# Patient Record
Sex: MALE | Race: WHITE | NOT HISPANIC OR LATINO | Employment: OTHER | URBAN - METROPOLITAN AREA
[De-identification: names, ages, dates, MRNs, and addresses within clinical notes are randomized per-mention and may not be internally consistent; named-entity substitution may affect disease eponyms.]

---

## 2017-01-04 DIAGNOSIS — M05.79 RHEUMATOID ARTHRITIS INVOLVING MULTIPLE SITES WITH POSITIVE RHEUMATOID FACTOR: ICD-10-CM

## 2017-01-04 RX ORDER — SULFASALAZINE 500 MG/1
1000 TABLET, DELAYED RELEASE ORAL 2 TIMES DAILY
Qty: 360 TABLET | Refills: 0 | Status: SHIPPED | OUTPATIENT
Start: 2017-01-04 | End: 2017-04-02 | Stop reason: SDUPTHER

## 2017-02-06 DIAGNOSIS — M35.00 SJOGREN'S SYNDROME: Chronic | ICD-10-CM

## 2017-02-06 RX ORDER — CEVIMELINE HYDROCHLORIDE 30 MG/1
30 CAPSULE ORAL 3 TIMES DAILY
Qty: 90 CAPSULE | Refills: 3 | Status: SHIPPED | OUTPATIENT
Start: 2017-02-06 | End: 2017-05-23 | Stop reason: SDUPTHER

## 2017-02-21 DIAGNOSIS — J20.9 ACUTE BRONCHITIS, UNSPECIFIED ORGANISM: Primary | ICD-10-CM

## 2017-02-21 DIAGNOSIS — M05.79 RHEUMATOID ARTHRITIS INVOLVING MULTIPLE SITES WITH POSITIVE RHEUMATOID FACTOR: ICD-10-CM

## 2017-02-21 RX ORDER — METHOTREXATE 2.5 MG/1
15 TABLET ORAL
Qty: 78 TABLET | Refills: 0 | Status: SHIPPED | OUTPATIENT
Start: 2017-02-21 | End: 2017-07-06 | Stop reason: SDUPTHER

## 2017-02-21 RX ORDER — TADALAFIL 5 MG/1
TABLET, FILM COATED ORAL
Refills: 4 | COMMUNITY
Start: 2016-12-21

## 2017-02-21 RX ORDER — AZITHROMYCIN 250 MG/1
TABLET, FILM COATED ORAL
Qty: 6 TABLET | Refills: 0 | Status: SHIPPED | OUTPATIENT
Start: 2017-02-21 | End: 2019-10-07 | Stop reason: SDUPTHER

## 2017-02-27 DIAGNOSIS — M05.79 RHEUMATOID ARTHRITIS INVOLVING MULTIPLE SITES WITH POSITIVE RHEUMATOID FACTOR: ICD-10-CM

## 2017-02-27 RX ORDER — MELOXICAM 7.5 MG/1
7.5 TABLET ORAL DAILY
Qty: 90 TABLET | Refills: 3 | Status: SHIPPED | OUTPATIENT
Start: 2017-02-27

## 2017-03-09 ENCOUNTER — PATIENT MESSAGE (OUTPATIENT)
Dept: RHEUMATOLOGY | Facility: CLINIC | Age: 72
End: 2017-03-09

## 2017-03-09 DIAGNOSIS — M05.79 RHEUMATOID ARTHRITIS INVOLVING MULTIPLE SITES WITH POSITIVE RHEUMATOID FACTOR: ICD-10-CM

## 2017-03-09 DIAGNOSIS — Z79.631 METHOTREXATE, LONG TERM, CURRENT USE: ICD-10-CM

## 2017-03-09 RX ORDER — LEUCOVORIN CALCIUM 5 MG/1
5 TABLET ORAL DAILY
Qty: 90 TABLET | Refills: 3 | Status: SHIPPED | OUTPATIENT
Start: 2017-03-09 | End: 2021-11-22

## 2017-03-16 ENCOUNTER — LAB VISIT (OUTPATIENT)
Dept: LAB | Facility: OTHER | Age: 72
End: 2017-03-16
Attending: INTERNAL MEDICINE
Payer: MEDICARE

## 2017-03-16 DIAGNOSIS — M35.00 SJOGREN'S SYNDROME: Chronic | ICD-10-CM

## 2017-03-16 DIAGNOSIS — Z79.631 METHOTREXATE, LONG TERM, CURRENT USE: ICD-10-CM

## 2017-03-16 DIAGNOSIS — M05.79 RHEUMATOID ARTHRITIS INVOLVING MULTIPLE SITES WITH POSITIVE RHEUMATOID FACTOR: Chronic | ICD-10-CM

## 2017-03-16 LAB
ALBUMIN SERPL BCP-MCNC: 4.2 G/DL
ALP SERPL-CCNC: 72 U/L
ALT SERPL W/O P-5'-P-CCNC: 22 U/L
ANION GAP SERPL CALC-SCNC: 7 MMOL/L
AST SERPL-CCNC: 30 U/L
BASOPHILS # BLD AUTO: 0.01 K/UL
BASOPHILS NFR BLD: 0.1 %
BILIRUB SERPL-MCNC: 0.6 MG/DL
BUN SERPL-MCNC: 27 MG/DL
CALCIUM SERPL-MCNC: 9.1 MG/DL
CHLORIDE SERPL-SCNC: 105 MMOL/L
CO2 SERPL-SCNC: 26 MMOL/L
CREAT SERPL-MCNC: 1.2 MG/DL
CRP SERPL-MCNC: 1.3 MG/L
DIFFERENTIAL METHOD: ABNORMAL
EOSINOPHIL # BLD AUTO: 0.1 K/UL
EOSINOPHIL NFR BLD: 1.7 %
ERYTHROCYTE [DISTWIDTH] IN BLOOD BY AUTOMATED COUNT: 13.7 %
ERYTHROCYTE [SEDIMENTATION RATE] IN BLOOD BY WESTERGREN METHOD: 9 MM/HR
EST. GFR  (AFRICAN AMERICAN): >60 ML/MIN/1.73 M^2
EST. GFR  (NON AFRICAN AMERICAN): >60 ML/MIN/1.73 M^2
GLUCOSE SERPL-MCNC: 93 MG/DL
HCT VFR BLD AUTO: 40.8 %
HGB BLD-MCNC: 13.8 G/DL
LYMPHOCYTES # BLD AUTO: 1.2 K/UL
LYMPHOCYTES NFR BLD: 17.1 %
MCH RBC QN AUTO: 32.5 PG
MCHC RBC AUTO-ENTMCNC: 33.8 %
MCV RBC AUTO: 96 FL
MONOCYTES # BLD AUTO: 0.6 K/UL
MONOCYTES NFR BLD: 7.8 %
NEUTROPHILS # BLD AUTO: 5.1 K/UL
NEUTROPHILS NFR BLD: 73 %
PLATELET # BLD AUTO: 139 K/UL
PMV BLD AUTO: 10.5 FL
POTASSIUM SERPL-SCNC: 4.2 MMOL/L
PROT SERPL-MCNC: 7.3 G/DL
RBC # BLD AUTO: 4.25 M/UL
SODIUM SERPL-SCNC: 138 MMOL/L
WBC # BLD AUTO: 7.02 K/UL

## 2017-03-16 PROCEDURE — 85025 COMPLETE CBC W/AUTO DIFF WBC: CPT

## 2017-03-16 PROCEDURE — 80053 COMPREHEN METABOLIC PANEL: CPT

## 2017-03-16 PROCEDURE — 86140 C-REACTIVE PROTEIN: CPT

## 2017-03-16 PROCEDURE — 36415 COLL VENOUS BLD VENIPUNCTURE: CPT

## 2017-03-16 PROCEDURE — 85651 RBC SED RATE NONAUTOMATED: CPT

## 2017-03-22 ENCOUNTER — OFFICE VISIT (OUTPATIENT)
Dept: RHEUMATOLOGY | Facility: CLINIC | Age: 72
End: 2017-03-22
Payer: MEDICARE

## 2017-03-22 VITALS
BODY MASS INDEX: 24.3 KG/M2 | DIASTOLIC BLOOD PRESSURE: 78 MMHG | SYSTOLIC BLOOD PRESSURE: 136 MMHG | WEIGHT: 179.38 LBS | HEART RATE: 77 BPM | HEIGHT: 72 IN

## 2017-03-22 DIAGNOSIS — M05.79 RHEUMATOID ARTHRITIS INVOLVING MULTIPLE SITES WITH POSITIVE RHEUMATOID FACTOR: Primary | Chronic | ICD-10-CM

## 2017-03-22 PROCEDURE — 99213 OFFICE O/P EST LOW 20 MIN: CPT | Mod: PBBFAC | Performed by: INTERNAL MEDICINE

## 2017-03-22 PROCEDURE — 99214 OFFICE O/P EST MOD 30 MIN: CPT | Mod: S$PBB,,, | Performed by: INTERNAL MEDICINE

## 2017-03-22 PROCEDURE — 99999 PR PBB SHADOW E&M-EST. PATIENT-LVL III: CPT | Mod: PBBFAC,,, | Performed by: INTERNAL MEDICINE

## 2017-03-22 RX ORDER — AZITHROMYCIN 250 MG/1
TABLET, FILM COATED ORAL
COMMUNITY
Start: 2017-02-27 | End: 2017-09-29 | Stop reason: ALTCHOICE

## 2017-03-22 RX ORDER — TAMSULOSIN HYDROCHLORIDE 0.4 MG/1
CAPSULE ORAL
COMMUNITY
Start: 2017-02-22 | End: 2017-09-29

## 2017-03-22 ASSESSMENT — ROUTINE ASSESSMENT OF PATIENT INDEX DATA (RAPID3)
PAIN SCORE: .5
WHEN YOU AWAKENED IN THE MORNING OVER THE LAST WEEK, PLEASE INDICATE THE AMOUNT OF TIME IT TAKES UNTIL YOU ARE AS LIMBER AS YOU WILL BE FOR THE DAY: 5-15 MIN
PATIENT GLOBAL ASSESSMENT SCORE: .5
PSYCHOLOGICAL DISTRESS SCORE: 1.1
FATIGUE SCORE: 1.5
AM STIFFNESS SCORE: 1, YES
MDHAQ FUNCTION SCORE: .1
TOTAL RAPID3 SCORE: .44

## 2017-03-22 NOTE — PROGRESS NOTES
"Subjective:       Patient ID: Tom Sutherland is a 71 y.o. male.    Chief Complaint: Follow-up    HPI     2011 symmetric polyarthritis and dry eyes, mouth  MTX 2011 - present  HCQ 2011 - present  Humira 2012 x 6 mo    [place]; +SSA, SSB, -CCP, RF 22  SSZ added to MTX + HCQ Aug 2015    Current:  MTX 12.5 mg / week   bid  SSZ 1000 bid  Meloxicam  cevimilene    Some hand stiffness but has been gardening, travelling, and making presentations    Caught a significant cold after a flight but it resolved    Pain 0.5/10  Global 0.5/10  MHAQ 0.1 / 3    Review of Systems   Constitutional: Negative for fatigue, fever and unexpected weight change.   HENT: Negative for mouth sores.    Respiratory: Negative for cough and shortness of breath.    Cardiovascular: Negative for chest pain.   Gastrointestinal: Negative for abdominal pain and diarrhea.   Genitourinary: Negative for dysuria.   Skin: Negative for rash.   Neurological: Negative for headaches.   Psychiatric/Behavioral: Negative for sleep disturbance.         Objective:     /78 (BP Location: Right arm, Patient Position: Sitting, BP Method: Automatic)  Pulse 77  Ht 5' 11.5" (1.816 m)  Wt 81.4 kg (179 lb 6.4 oz)  BMI 24.67 kg/m2     Physical Exam   Constitutional: He is well-developed, well-nourished, and in no distress.   HENT:   Mouth/Throat: No oropharyngeal exudate.   Eyes: No scleral icterus.   Abdominal: There is no tenderness.   Lymphadenopathy:     He has no cervical adenopathy.   Skin: No rash noted.     No rash but sl hyperpigmentation UE that may be due to HCQ   Musculoskeletal:   UE joints and Knees nonswollen nontender         CBC nl x Hgb 13.8  Esr 9  Creat 1.2  Alb 4.2  AST/ALT 30/22  CRP 1.3   Assessment:       1. Rheumatoid arthritis involving multiple sites with positive rheumatoid factor        Rheumatoid arthritis now controlled on triple therapy  Sjogren's syndrome with dry mouth symptoms improved on either side  Slight " thrombocytopenia on methotrexate in June  No interval infectious complications of current high risk medication therapy    Plan:     1.  Cont current meds  2.  Lab test this week and then in 3 months and in 6 months  3.  Return to see me in 6 months

## 2017-03-22 NOTE — MR AVS SNAPSHOT
[facility]  [address]  [facility] [address]  Phone: [phone]  Fax: [phone]                  Tom Sutherland   3/22/2017 4:00 PM   Office Visit    Description:  Male : 1945   Provider:  Shukri Lee MD   Department:  [facility]           Reason for Visit     Follow-up           Diagnoses this Visit        Comments    Rheumatoid arthritis involving multiple sites with positive rheumatoid factor    -  Primary            To Do List           Goals (5 Years of Data)     None      Follow-Up and Disposition     Return in about 6 months (around 2017).      [facility]     [facility] -  Assistance  Registered nurses in the [facility] provide clinical advisement, health education, appointment booking, and other advisory services.  Call for this free service at [phone].             Medications           Message regarding Medications     Verify the changes and/or additions to your medication regime listed below are the same as discussed with your clinician today.  If any of these changes or additions are incorrect, please notify your healthcare provider.             Verify that the below list of medications is an accurate representation of the medications you are currently taking.  If none reported, the list may be blank. If incorrect, please contact your healthcare provider. Carry this list with you in case of emergency.           Current Medications     aspirin (ECOTRIN) 81 MG EC tablet Take 81 mg by mouth once daily.    azithromycin (Z-JANEL) 250 MG tablet     cevimeline (EVOXAC) 30 mg capsule Take 1 capsule (30 mg total) by mouth 3 (three) times daily.    CIALIS 5 mg tablet TK 1 T PO QD    fluticasone (FLONASE) 50 mcg/actuation nasal spray     hydroxychloroquine (PLAQUENIL) 200 mg tablet Take 1 tablet (200 mg total) by mouth 2 (two) times daily.    leucovorin (WELLCOVORIN) 5 mg Tab Take 1 tablet (5 mg  "total) by mouth once daily.    losartan-hydrochlorothiazide 50-12.5 mg (HYZAAR) 50-12.5 mg per tablet     meloxicam (MOBIC) 7.5 MG tablet Take 1 tablet (7.5 mg total) by mouth once daily.    methotrexate 2.5 MG Tab Take 6 tablets (15 mg total) by mouth every 7 days.    PATANOL 0.1 % ophthalmic solution     ranitidine (ZANTAC) 150 MG tablet     sulfaSALAzine (AZULFIDINE) 500 MG TbEC Take 2 tablets (1,000 mg total) by mouth 2 (two) times daily.    tamsulosin (FLOMAX) 0.4 mg Cp24            Clinical Reference Information           Your Vitals Were     BP Pulse Height Weight BMI    136/78 (BP Location: Right arm, Patient Position: Sitting, BP Method: Automatic) 77 5' 11.5" (1.816 m) 81.4 kg (179 lb 6.4 oz) 24.67 kg/m2      Blood Pressure          Most Recent Value    BP  136/78      Allergies as of 3/22/2017     No Known Allergies      Immunizations Administered on Date of Encounter - 3/22/2017     None      Language Assistance Services     ATTENTION: Language assistance services are available, free of charge. Please call [phone].      ATENCIÓN: Si kendall caldwell, tiene a green disposición servicios gratuitos de asistencia lingüística. Llame al [phone].     ISMA Ý: N?u b?n nói Ti?ng Vi?t, có các d?ch v? h? tr? ngôn ng? mi?n phí dành cho b?n. G?i s? [phone].         [facility] complies with applicable Federal civil rights laws and does not discriminate on the basis of race, color, national origin, age, disability, or sex.        "

## 2017-04-02 DIAGNOSIS — M05.79 RHEUMATOID ARTHRITIS INVOLVING MULTIPLE SITES WITH POSITIVE RHEUMATOID FACTOR: ICD-10-CM

## 2017-04-03 RX ORDER — SULFASALAZINE 500 MG/1
TABLET, DELAYED RELEASE ORAL
Qty: 360 TABLET | Refills: 1 | Status: SHIPPED | OUTPATIENT
Start: 2017-04-03 | End: 2017-09-30 | Stop reason: SDUPTHER

## 2017-04-12 DIAGNOSIS — M05.79 RHEUMATOID ARTHRITIS INVOLVING MULTIPLE SITES WITH POSITIVE RHEUMATOID FACTOR: ICD-10-CM

## 2017-04-13 RX ORDER — HYDROXYCHLOROQUINE SULFATE 200 MG/1
200 TABLET, FILM COATED ORAL 2 TIMES DAILY
Qty: 60 TABLET | Refills: 3 | Status: SHIPPED | OUTPATIENT
Start: 2017-04-13 | End: 2017-08-08 | Stop reason: SDUPTHER

## 2017-05-09 ENCOUNTER — TELEPHONE (OUTPATIENT)
Dept: RHEUMATOLOGY | Facility: CLINIC | Age: 72
End: 2017-05-09

## 2017-05-23 ENCOUNTER — PATIENT MESSAGE (OUTPATIENT)
Dept: RHEUMATOLOGY | Facility: CLINIC | Age: 72
End: 2017-05-23

## 2017-05-23 DIAGNOSIS — M35.01 SJOGREN'S SYNDROME WITH KERATOCONJUNCTIVITIS SICCA: Primary | ICD-10-CM

## 2017-05-24 RX ORDER — CEVIMELINE HYDROCHLORIDE 30 MG/1
30 CAPSULE ORAL 3 TIMES DAILY
Qty: 180 CAPSULE | Refills: 3 | Status: SHIPPED | OUTPATIENT
Start: 2017-05-24 | End: 2018-05-31 | Stop reason: SDUPTHER

## 2017-07-05 ENCOUNTER — LAB VISIT (OUTPATIENT)
Dept: LAB | Facility: OTHER | Age: 72
End: 2017-07-05
Attending: NURSE PRACTITIONER
Payer: MEDICARE

## 2017-07-05 DIAGNOSIS — Z79.631 METHOTREXATE, LONG TERM, CURRENT USE: ICD-10-CM

## 2017-07-05 DIAGNOSIS — M05.79 RHEUMATOID ARTHRITIS INVOLVING MULTIPLE SITES WITH POSITIVE RHEUMATOID FACTOR: Chronic | ICD-10-CM

## 2017-07-05 DIAGNOSIS — M05.79 RHEUMATOID ARTHRITIS INVOLVING MULTIPLE SITES WITH POSITIVE RHEUMATOID FACTOR: ICD-10-CM

## 2017-07-05 DIAGNOSIS — M35.00 SJOGREN'S SYNDROME: Chronic | ICD-10-CM

## 2017-07-05 LAB
ALBUMIN SERPL BCP-MCNC: 4.1 G/DL
ALP SERPL-CCNC: 68 U/L
ALT SERPL W/O P-5'-P-CCNC: 19 U/L
ANION GAP SERPL CALC-SCNC: 8 MMOL/L
AST SERPL-CCNC: 21 U/L
BASOPHILS # BLD AUTO: 0.02 K/UL
BASOPHILS NFR BLD: 0.4 %
BILIRUB SERPL-MCNC: 0.5 MG/DL
BUN SERPL-MCNC: 25 MG/DL
CALCIUM SERPL-MCNC: 9.1 MG/DL
CHLORIDE SERPL-SCNC: 106 MMOL/L
CO2 SERPL-SCNC: 26 MMOL/L
CREAT SERPL-MCNC: 1 MG/DL
CRP SERPL-MCNC: 1 MG/L
DIFFERENTIAL METHOD: ABNORMAL
EOSINOPHIL # BLD AUTO: 0.2 K/UL
EOSINOPHIL NFR BLD: 3.6 %
ERYTHROCYTE [DISTWIDTH] IN BLOOD BY AUTOMATED COUNT: 13.7 %
ERYTHROCYTE [SEDIMENTATION RATE] IN BLOOD BY WESTERGREN METHOD: 7 MM/HR
EST. GFR  (AFRICAN AMERICAN): >60 ML/MIN/1.73 M^2
EST. GFR  (NON AFRICAN AMERICAN): >60 ML/MIN/1.73 M^2
GLUCOSE SERPL-MCNC: 77 MG/DL
HCT VFR BLD AUTO: 40.3 %
HGB BLD-MCNC: 13.9 G/DL
LYMPHOCYTES # BLD AUTO: 1.6 K/UL
LYMPHOCYTES NFR BLD: 31.2 %
MCH RBC QN AUTO: 33.4 PG
MCHC RBC AUTO-ENTMCNC: 34.5 %
MCV RBC AUTO: 97 FL
MONOCYTES # BLD AUTO: 0.5 K/UL
MONOCYTES NFR BLD: 10.5 %
NEUTROPHILS # BLD AUTO: 2.7 K/UL
NEUTROPHILS NFR BLD: 54.3 %
PLATELET # BLD AUTO: 142 K/UL
PMV BLD AUTO: 10.5 FL
POTASSIUM SERPL-SCNC: 4.1 MMOL/L
PROT SERPL-MCNC: 7.3 G/DL
RBC # BLD AUTO: 4.16 M/UL
SODIUM SERPL-SCNC: 140 MMOL/L
WBC # BLD AUTO: 5.03 K/UL

## 2017-07-05 PROCEDURE — 36415 COLL VENOUS BLD VENIPUNCTURE: CPT

## 2017-07-05 PROCEDURE — 86140 C-REACTIVE PROTEIN: CPT

## 2017-07-05 PROCEDURE — 85025 COMPLETE CBC W/AUTO DIFF WBC: CPT

## 2017-07-05 PROCEDURE — 85651 RBC SED RATE NONAUTOMATED: CPT

## 2017-07-05 PROCEDURE — 80053 COMPREHEN METABOLIC PANEL: CPT

## 2017-07-05 RX ORDER — METHOTREXATE 2.5 MG/1
15 TABLET ORAL
Qty: 78 TABLET | Refills: 0 | OUTPATIENT
Start: 2017-07-05 | End: 2017-09-28

## 2017-07-06 ENCOUNTER — TELEPHONE (OUTPATIENT)
Dept: RHEUMATOLOGY | Facility: CLINIC | Age: 72
End: 2017-07-06

## 2017-07-06 DIAGNOSIS — M05.79 RHEUMATOID ARTHRITIS INVOLVING MULTIPLE SITES WITH POSITIVE RHEUMATOID FACTOR: ICD-10-CM

## 2017-07-06 RX ORDER — METHOTREXATE 2.5 MG/1
15 TABLET ORAL
Qty: 78 TABLET | Refills: 0 | Status: SHIPPED | OUTPATIENT
Start: 2017-07-06 | End: 2017-09-29 | Stop reason: SDUPTHER

## 2017-07-06 RX ORDER — METHOTREXATE 2.5 MG/1
15 TABLET ORAL
Qty: 78 TABLET | Refills: 0 | Status: SHIPPED | OUTPATIENT
Start: 2017-07-06 | End: 2017-07-06 | Stop reason: SDUPTHER

## 2017-08-08 ENCOUNTER — PATIENT MESSAGE (OUTPATIENT)
Dept: RHEUMATOLOGY | Facility: CLINIC | Age: 72
End: 2017-08-08

## 2017-08-08 DIAGNOSIS — M05.79 RHEUMATOID ARTHRITIS INVOLVING MULTIPLE SITES WITH POSITIVE RHEUMATOID FACTOR: ICD-10-CM

## 2017-08-08 RX ORDER — HYDROXYCHLOROQUINE SULFATE 200 MG/1
200 TABLET, FILM COATED ORAL 2 TIMES DAILY
Qty: 60 TABLET | Refills: 3 | Status: SHIPPED | OUTPATIENT
Start: 2017-08-08 | End: 2017-11-27 | Stop reason: SDUPTHER

## 2017-08-12 ENCOUNTER — PATIENT MESSAGE (OUTPATIENT)
Dept: OPTOMETRY | Facility: CLINIC | Age: 72
End: 2017-08-12

## 2017-08-21 ENCOUNTER — PATIENT MESSAGE (OUTPATIENT)
Dept: RHEUMATOLOGY | Facility: CLINIC | Age: 72
End: 2017-08-21

## 2017-08-21 NOTE — TELEPHONE ENCOUNTER
Got machine; will have to call him again tomorrow to recommend he stop meloxicam and methotrexate for surgery, and when he restarts mtx reduce to 10 mg/ week

## 2017-08-29 ENCOUNTER — TELEPHONE (OUTPATIENT)
Dept: RHEUMATOLOGY | Facility: CLINIC | Age: 72
End: 2017-08-29

## 2017-08-29 NOTE — TELEPHONE ENCOUNTER
Informed of the need to reschedule appointment due to a change in the provider's schedule.  Verbalizes understanding.

## 2017-09-13 ENCOUNTER — OFFICE VISIT (OUTPATIENT)
Dept: OPTOMETRY | Facility: CLINIC | Age: 72
End: 2017-09-13
Payer: MEDICARE

## 2017-09-13 ENCOUNTER — CLINICAL SUPPORT (OUTPATIENT)
Dept: OPHTHALMOLOGY | Facility: CLINIC | Age: 72
End: 2017-09-13
Payer: MEDICARE

## 2017-09-13 DIAGNOSIS — H25.13 NUCLEAR SCLEROSIS OF BOTH EYES: ICD-10-CM

## 2017-09-13 DIAGNOSIS — Z79.899 HISTORY OF ONGOING TREATMENT WITH HIGH-RISK MEDICATION: ICD-10-CM

## 2017-09-13 DIAGNOSIS — Z79.899 LONG-TERM USE OF PLAQUENIL: ICD-10-CM

## 2017-09-13 DIAGNOSIS — H52.223 REGULAR ASTIGMATISM, BILATERAL: ICD-10-CM

## 2017-09-13 DIAGNOSIS — H52.4 PRESBYOPIA: ICD-10-CM

## 2017-09-13 DIAGNOSIS — M06.9 RHEUMATOID ARTHRITIS OF HAND, UNSPECIFIED LATERALITY, UNSPECIFIED RHEUMATOID FACTOR PRESENCE: Primary | ICD-10-CM

## 2017-09-13 PROCEDURE — 92014 COMPRE OPH EXAM EST PT 1/>: CPT | Mod: S$PBB,,, | Performed by: OPTOMETRIST

## 2017-09-13 PROCEDURE — 92083 EXTENDED VISUAL FIELD XM: CPT | Mod: PBBFAC | Performed by: OPTOMETRIST

## 2017-09-13 PROCEDURE — 92134 CPTRZ OPH DX IMG PST SGM RTA: CPT | Mod: PBBFAC | Performed by: OPTOMETRIST

## 2017-09-13 PROCEDURE — 99212 OFFICE O/P EST SF 10 MIN: CPT | Mod: PBBFAC,25 | Performed by: OPTOMETRIST

## 2017-09-13 PROCEDURE — 99999 PR PBB SHADOW E&M-EST. PATIENT-LVL II: CPT | Mod: PBBFAC,,, | Performed by: OPTOMETRIST

## 2017-09-13 PROCEDURE — 92015 DETERMINE REFRACTIVE STATE: CPT | Mod: ,,, | Performed by: OPTOMETRIST

## 2017-09-13 NOTE — PATIENT INSTRUCTIONS
History of Plaquenil use x 5 years.  No evidence of Plaquenil toxicity in either eye.  Repeat HVF test (10-2) and Spectralis OCT of retina at macula done today  Central 10° visual field normal in each eye without evidence of abnormality in either eye.  No evidence of abnormality of foveal contour on OCT in either eye.  No evidence of pigmentary changes at macula/posterior pole secondary to Plaquenil use in either on previous visit.  Okay to continue Plaquenil use under supervision of rheumatologist.    Nuclear sclerosis of lens of both eyes, consistent with age.  No need for cataract surgery.    Astigmatic refractive error in each eye, with very satisfactory correctable VA in each eye.  Presbyopia consistent with age.  New spectacle lens Rx issued for use as desired.  Recommend full-time wear.     Recheck in one year, with repeat HVF test (10-2) done at that time.

## 2017-09-13 NOTE — PROGRESS NOTES
"HPI     Concerns About Ocular Health    Additional comments: Eye exam  HVF 10-2 done today.            Comments   Plaquenil use since 2012 for rheumatoid arthritis.   Also taking   Mexathexate, but dosage being reduced "because of a low blood count."     Patient's age: 72 y.o.  Occupation: Retired in 2010 [employer] and [employer]   Approximate date of last eye examination:  09/19/2016  Name of last eye doctor seen: Dr Dominguez  City/State: [facility]  Wears glasses? Yes     If yes, wears  Full-time or part-time?  Full-time  Present glasses are: Bifocal, SV Distance, SV Reading?  Progressive lens  Approximate age of present glasses:  2016  Got new glasses following last exam, or subsequently?: Yes    Any problem with VA with glasses?  No  Headaches?  No  Eye pain/discomfort?  No                                                                                     Flashes?  No  Floaters?  No  Diplopia/Double vision?  No  Patient's Ocular History:         Any eye surgeries? No         Any eye injury?  No         Any treatment for eye disease?  No  Family history of eye disease?  No  Significant patient medical history:         1. Diabetes?  No   2. HBP?  Yes, controlled by medication and diet              3. Other (describe):  Rheumatoid arthritis    ! OTC eyedrops currently using:  Visine as needed for dryness   ! Prescription eye meds currently using:  Patanol as needed    ! Any history of allergy/adverse reaction to any eye meds used   previously?  No    ! Any history of allergy/adverse reaction to eyedrops used during prior   eye exam(s)? No    ! Any history of allergy/adverse reaction to Novacaine or similar meds?   No   ! Any history of allergy/adverse reaction to Epinephrine or similar meds?   No    ! Patient okay with use of anesthetic eyedrops to check eye pressure?    Yes        ! Patient okay with use of eyedrops to dilate pupils today?  Yes   !  Allergies/Medications/Medical History/Family History reviewed today?  " "  Yes      PD =   64/60  Desired reading distance =  19.5"                                                                      Last edited by Dudley Dominguez, OD on 9/13/2017  3:40 PM. (History)            Assessment /Plan     For exam results, see Encounter Report.    1. Rheumatoid arthritis of hand, unspecified laterality, unspecified rheumatoid factor presence  Wylie Visual Field - OU - Extended - Both Eyes    Posterior Segment OCT Retina-Both eyes   2. Long-term use of Plaquenil  Wylie Visual Field - OU - Extended - Both Eyes    Posterior Segment OCT Retina-Both eyes   3. History of ongoing treatment with high-risk medication  Wylie Visual Field - OU - Extended - Both Eyes    Posterior Segment OCT Retina-Both eyes   4. Nuclear sclerosis of both eyes     5. Regular astigmatism, bilateral     6. Presbyopia                  History of Plaquenil use x 5 years.  No evidence of Plaquenil toxicity in either eye.  Repeat HVF test (10-2) and Spectralis OCT of retina at macula done today  Central 10° visual field normal in each eye without evidence of abnormality in either eye.  No evidence of abnormality of foveal contour on OCT in either eye.  No evidence of pigmentary changes at macula/posterior pole secondary to Plaquenil use in either on previous visit.  Okay to continue Plaquenil use under supervision of rheumatologist.    Nuclear sclerosis of lens of both eyes, consistent with age.  No need for cataract surgery.    Astigmatic refractive error in each eye, with very satisfactory correctable VA in each eye.  Presbyopia consistent with age.  New spectacle lens Rx issued for use as desired.  Recommend full-time wear.     Recheck in one year, with repeat HVF test (10-2) done at that time.        "

## 2017-09-21 ENCOUNTER — LAB VISIT (OUTPATIENT)
Dept: LAB | Facility: OTHER | Age: 72
End: 2017-09-21
Attending: INTERNAL MEDICINE
Payer: MEDICARE

## 2017-09-21 DIAGNOSIS — M35.00 SJOGREN'S SYNDROME: Chronic | ICD-10-CM

## 2017-09-21 DIAGNOSIS — M05.79 RHEUMATOID ARTHRITIS INVOLVING MULTIPLE SITES WITH POSITIVE RHEUMATOID FACTOR: Chronic | ICD-10-CM

## 2017-09-21 DIAGNOSIS — Z79.631 METHOTREXATE, LONG TERM, CURRENT USE: ICD-10-CM

## 2017-09-21 LAB
ALBUMIN SERPL BCP-MCNC: 3.7 G/DL
ALP SERPL-CCNC: 67 U/L
ALT SERPL W/O P-5'-P-CCNC: 15 U/L
ANION GAP SERPL CALC-SCNC: 10 MMOL/L
AST SERPL-CCNC: 24 U/L
BASOPHILS # BLD AUTO: 0.02 K/UL
BASOPHILS NFR BLD: 0.6 %
BILIRUB SERPL-MCNC: 0.7 MG/DL
BUN SERPL-MCNC: 20 MG/DL
CALCIUM SERPL-MCNC: 8.9 MG/DL
CHLORIDE SERPL-SCNC: 106 MMOL/L
CO2 SERPL-SCNC: 24 MMOL/L
CREAT SERPL-MCNC: 1.1 MG/DL
CRP SERPL-MCNC: 2.6 MG/L
DIFFERENTIAL METHOD: ABNORMAL
EOSINOPHIL # BLD AUTO: 0.1 K/UL
EOSINOPHIL NFR BLD: 3.5 %
ERYTHROCYTE [DISTWIDTH] IN BLOOD BY AUTOMATED COUNT: 13.4 %
ERYTHROCYTE [SEDIMENTATION RATE] IN BLOOD BY WESTERGREN METHOD: 9 MM/HR
EST. GFR  (AFRICAN AMERICAN): >60 ML/MIN/1.73 M^2
EST. GFR  (NON AFRICAN AMERICAN): >60 ML/MIN/1.73 M^2
GLUCOSE SERPL-MCNC: 95 MG/DL
HCT VFR BLD AUTO: 36.3 %
HGB BLD-MCNC: 12.4 G/DL
LYMPHOCYTES # BLD AUTO: 0.9 K/UL
LYMPHOCYTES NFR BLD: 25.1 %
MCH RBC QN AUTO: 33 PG
MCHC RBC AUTO-ENTMCNC: 34.2 G/DL
MCV RBC AUTO: 97 FL
MONOCYTES # BLD AUTO: 0.5 K/UL
MONOCYTES NFR BLD: 13 %
NEUTROPHILS # BLD AUTO: 2 K/UL
NEUTROPHILS NFR BLD: 57.5 %
PLATELET # BLD AUTO: 137 K/UL
PMV BLD AUTO: 10.3 FL
POTASSIUM SERPL-SCNC: 3.7 MMOL/L
PROT SERPL-MCNC: 7 G/DL
RBC # BLD AUTO: 3.76 M/UL
SODIUM SERPL-SCNC: 140 MMOL/L
WBC # BLD AUTO: 3.46 K/UL

## 2017-09-21 PROCEDURE — 85025 COMPLETE CBC W/AUTO DIFF WBC: CPT

## 2017-09-21 PROCEDURE — 85651 RBC SED RATE NONAUTOMATED: CPT

## 2017-09-21 PROCEDURE — 86140 C-REACTIVE PROTEIN: CPT

## 2017-09-21 PROCEDURE — 80053 COMPREHEN METABOLIC PANEL: CPT

## 2017-09-21 PROCEDURE — 36415 COLL VENOUS BLD VENIPUNCTURE: CPT

## 2017-09-22 ENCOUNTER — PATIENT MESSAGE (OUTPATIENT)
Dept: RHEUMATOLOGY | Facility: CLINIC | Age: 72
End: 2017-09-22

## 2017-09-29 ENCOUNTER — OFFICE VISIT (OUTPATIENT)
Dept: RHEUMATOLOGY | Facility: CLINIC | Age: 72
End: 2017-09-29
Payer: MEDICARE

## 2017-09-29 VITALS
BODY MASS INDEX: 24.11 KG/M2 | HEART RATE: 77 BPM | HEIGHT: 72 IN | WEIGHT: 178 LBS | DIASTOLIC BLOOD PRESSURE: 84 MMHG | SYSTOLIC BLOOD PRESSURE: 140 MMHG

## 2017-09-29 DIAGNOSIS — Z79.631 METHOTREXATE, LONG TERM, CURRENT USE: ICD-10-CM

## 2017-09-29 DIAGNOSIS — M05.79 RHEUMATOID ARTHRITIS INVOLVING MULTIPLE SITES WITH POSITIVE RHEUMATOID FACTOR: ICD-10-CM

## 2017-09-29 DIAGNOSIS — D69.6 THROMBOCYTOPENIA: ICD-10-CM

## 2017-09-29 DIAGNOSIS — M35.01 SJOGREN'S SYNDROME WITH KERATOCONJUNCTIVITIS SICCA: Primary | Chronic | ICD-10-CM

## 2017-09-29 DIAGNOSIS — D70.2 OTHER DRUG-INDUCED NEUTROPENIA: ICD-10-CM

## 2017-09-29 DIAGNOSIS — Z79.899 LONG-TERM USE OF PLAQUENIL: ICD-10-CM

## 2017-09-29 PROCEDURE — 1159F MED LIST DOCD IN RCRD: CPT | Mod: ,,, | Performed by: INTERNAL MEDICINE

## 2017-09-29 PROCEDURE — 99999 PR PBB SHADOW E&M-EST. PATIENT-LVL III: CPT | Mod: PBBFAC,,, | Performed by: INTERNAL MEDICINE

## 2017-09-29 PROCEDURE — 99214 OFFICE O/P EST MOD 30 MIN: CPT | Mod: S$PBB,,, | Performed by: INTERNAL MEDICINE

## 2017-09-29 PROCEDURE — 1125F AMNT PAIN NOTED PAIN PRSNT: CPT | Mod: ,,, | Performed by: INTERNAL MEDICINE

## 2017-09-29 PROCEDURE — 99213 OFFICE O/P EST LOW 20 MIN: CPT | Mod: PBBFAC | Performed by: INTERNAL MEDICINE

## 2017-09-29 RX ORDER — FOLIC ACID 1 MG/1
2 TABLET ORAL DAILY
Qty: 180 TABLET | Refills: 3 | Status: SHIPPED | OUTPATIENT
Start: 2017-09-29 | End: 2018-09-07 | Stop reason: SDUPTHER

## 2017-09-29 RX ORDER — METHOTREXATE 2.5 MG/1
10 TABLET ORAL
Qty: 52 TABLET | Refills: 0
Start: 2017-09-29 | End: 2017-10-30 | Stop reason: SDUPTHER

## 2017-09-29 ASSESSMENT — ROUTINE ASSESSMENT OF PATIENT INDEX DATA (RAPID3)
PATIENT GLOBAL ASSESSMENT SCORE: .5
FATIGUE SCORE: 2
PSYCHOLOGICAL DISTRESS SCORE: 1.1
PAIN SCORE: .5
TOTAL RAPID3 SCORE: .44
MDHAQ FUNCTION SCORE: .1

## 2017-09-29 ASSESSMENT — DISEASE ACTIVITY SCORE (DAS28)
GLOBAL_HEALTH_SCORE: 5
ESR_MM_PER_HR: 9
TENDER_JOINTS_COUNT: 3
CRP_MG_PER_LITER: 2.6
TOTAL_SCORE_CRP: 2.46
TOTAL_SCORE_ESR: 2.58
SWOLLEN_JOINTS_COUNT: 0

## 2017-09-29 NOTE — PROGRESS NOTES
"Subjective:       Patient ID: Tom Sutherland is a 72 y.o. male.    Chief Complaint: Disease Management      2011 symmetric polyarthritis and dry eyes, mouth  MTX 2011 - present  HCQ 2011 - present  Humira 2012 x 6 mo    [place]; +SSA, SSB, -CCP, RF 22  SSZ added to MTX + HCQ Aug 2015    Current:  MTX 10 mg / week   bid  SSZ 1000 bid  Meloxicam  cevimilene    Some hand stiffness but has been gardening, travelling, and making presentations  Able to bend, stoop, kneel and install blinds    Pain 0.5/10  Global 0.5/10  MHAQ 0.1 / 3                Pertinent negatives include no dysuria, fatigue, fever or headaches.          Review of Systems   Constitutional: Negative for fatigue, fever and unexpected weight change.   HENT: Negative for mouth sores.    Respiratory: Negative for cough and shortness of breath.    Cardiovascular: Negative for chest pain.   Gastrointestinal: Negative for abdominal pain and diarrhea.   Genitourinary: Negative for dysuria.   Skin: Negative for rash.   Neurological: Negative for headaches.   Psychiatric/Behavioral: Negative for sleep disturbance.         Objective:     BP (!) 140/84   Pulse 77   Ht 5' 11.5" (1.816 m)   Wt 80.7 kg (178 lb)   BMI 24.48 kg/m²      Physical Exam   Constitutional: He is well-developed, well-nourished, and in no distress.   HENT:   Mouth/Throat: No oropharyngeal exudate.   Eyes: No scleral icterus.   Abdominal: There is no tenderness.   Lymphadenopathy:     He has no cervical adenopathy.   Skin: No rash noted.     No rash but sl hyperpigmentation UE that may be due to HCQ   Musculoskeletal:   1, 2, 3, MCP sl enlarged and trace tender; other UE joints and Knees nonswollen nontender           Lab Results   Component Value Date    WBC 3.46 (L) 09/21/2017    HGB 12.4 (L) 09/21/2017    HCT 36.3 (L) 09/21/2017    MCV 97 09/21/2017     (L) 09/21/2017      Lab Results   Component Value Date    SEDRATE 9 09/21/2017      Lab Results   Component " Value Date    CRP 2.6 09/21/2017      Lab Results   Component Value Date    CREATININE 1.1 09/21/2017      Lab Results   Component Value Date    ALBUMIN 3.7 09/21/2017      Lab Results   Component Value Date    ALT 15 09/21/2017       Assessment:       1. Sjogren's syndrome with keratoconjunctivitis sicca    2. Rheumatoid arthritis involving multiple sites with positive rheumatoid factor    3. Long-term use of Plaquenil    4. Methotrexate, long term, current use    5. Other drug-induced neutropenia    6. Thrombocytopenia        Rheumatoid arthritis  RA low disease activity on triple therapy  Sjogren's syndrome with dry mouth symptoms improved on Evoxac  Slight thrombocytopenia and leukopenia likely due to mtx plus SSZ    No interval infectious complications of current high risk medication therapy    Plan:     1.  Increase folic acid to 2 mg/d  2.  Lab test in 3 months and in 6 months (Dec and March  3.  Return to see me in 6 months- March

## 2017-09-30 DIAGNOSIS — M05.79 RHEUMATOID ARTHRITIS INVOLVING MULTIPLE SITES WITH POSITIVE RHEUMATOID FACTOR: ICD-10-CM

## 2017-10-02 RX ORDER — SULFASALAZINE 500 MG/1
TABLET, DELAYED RELEASE ORAL
Qty: 360 TABLET | Refills: 1 | Status: SHIPPED | OUTPATIENT
Start: 2017-10-02 | End: 2018-04-01 | Stop reason: SDUPTHER

## 2017-10-11 ENCOUNTER — TELEPHONE (OUTPATIENT)
Dept: CARDIOLOGY | Facility: CLINIC | Age: 72
End: 2017-10-11

## 2017-10-11 ENCOUNTER — PATIENT MESSAGE (OUTPATIENT)
Dept: CARDIOLOGY | Facility: CLINIC | Age: 72
End: 2017-10-11

## 2017-10-11 NOTE — TELEPHONE ENCOUNTER
----- Message from Shasha Beltran sent at 10/11/2017  3:27 PM CDT -----  Contact: pt [phone]  Pt says he's returning your call in ref to scheduling an appt with Dr. Osorio.    Thanks

## 2017-10-18 ENCOUNTER — OFFICE VISIT (OUTPATIENT)
Dept: CARDIOLOGY | Facility: CLINIC | Age: 72
End: 2017-10-18
Payer: MEDICARE

## 2017-10-18 VITALS
WEIGHT: 178.56 LBS | BODY MASS INDEX: 25 KG/M2 | DIASTOLIC BLOOD PRESSURE: 87 MMHG | HEART RATE: 70 BPM | SYSTOLIC BLOOD PRESSURE: 163 MMHG | HEIGHT: 71 IN

## 2017-10-18 DIAGNOSIS — I34.0 NON-RHEUMATIC MITRAL REGURGITATION: ICD-10-CM

## 2017-10-18 DIAGNOSIS — Z85.46 HISTORY OF PROSTATE CANCER: ICD-10-CM

## 2017-10-18 DIAGNOSIS — M05.79 RHEUMATOID ARTHRITIS INVOLVING MULTIPLE SITES WITH POSITIVE RHEUMATOID FACTOR: Primary | Chronic | ICD-10-CM

## 2017-10-18 DIAGNOSIS — I10 ESSENTIAL HYPERTENSION: ICD-10-CM

## 2017-10-18 PROCEDURE — 99213 OFFICE O/P EST LOW 20 MIN: CPT | Mod: PBBFAC | Performed by: NURSE PRACTITIONER

## 2017-10-18 PROCEDURE — 99204 OFFICE O/P NEW MOD 45 MIN: CPT | Mod: S$PBB,,, | Performed by: NURSE PRACTITIONER

## 2017-10-18 PROCEDURE — 99999 PR PBB SHADOW E&M-EST. PATIENT-LVL III: CPT | Mod: PBBFAC,,, | Performed by: NURSE PRACTITIONER

## 2017-10-18 NOTE — PROGRESS NOTES
Mr. Sutherland is a patient of Dr. Osorio and is a new patient to [facility].       Subjective:   Patient ID:  Tom Sutherland is a 72 y.o. male who presents for follow-up of [facility]  .   HPI:    Mr. Sutherland is a 71yo male with HTN, RA, Sjogren, history of prostate CA here for evaluation of mitral regurgitation. Murmur over the apex was heard during a pre-operative clearance prior to prostate resection surgery at [facility], which resulted in an echo that indicated normal EF, moderate-severe MR, normal IVC, enlarged LA, normal LV. Exercise stress test was negative for ischemia. Today Mr. Sutherland is here to [facility]. He has no cardiac complaints. Mr. Sutherland denies chest pain with exertion or at rest, palpitations, SOB, MAZARIEGOS, dizziness, syncope, leg edema, claudication, PND, or orthopnea. He is active and walks with his wife for miles without MAZARIEGOS. He can easily ascend several flights of stairs without stopping for dyspnea. He experiences no limitations in his activity tolerance.  He is currently taking ASA 81mg and losartan-HCTZ 50-12.5. He does not take his BP at home but does have a cuff. Repeat BP was 158/82, but other clinic measures show most pressures under 140/90. Creatinine is 1.1 on 9/21/2017. Hepatic transaminases are within normal limits.      Recent Cardiac Tests:    See media for echo, exercise stress from [facility].    Current Outpatient Prescriptions   Medication Sig    aspirin (ECOTRIN) 81 MG EC tablet Take 81 mg by mouth once daily.    cevimeline (EVOXAC) 30 mg capsule Take 1 capsule (30 mg total) by mouth 3 (three) times daily.    CIALIS 5 mg tablet TK 1 T PO QD    fluticasone (FLONASE) 50 mcg/actuation nasal spray     folic acid (FOLVITE) 1 MG tablet Take 2 tablets (2 mg total) by mouth once daily.    hydroxychloroquine (PLAQUENIL) 200 mg tablet Take 1 tablet (200 mg total) by mouth 2 (two) times daily.    leucovorin (WELLCOVORIN) 5 mg Tab Take 1 tablet (5 mg total) by mouth once  "daily.    losartan-hydrochlorothiazide 50-12.5 mg (HYZAAR) 50-12.5 mg per tablet     meloxicam (MOBIC) 7.5 MG tablet Take 1 tablet (7.5 mg total) by mouth once daily.    methotrexate 2.5 MG Tab Take 4 tablets (10 mg total) by mouth every 7 days.    PATANOL 0.1 % ophthalmic solution     ranitidine (ZANTAC) 150 MG tablet     sulfaSALAzine (AZULFIDINE) 500 MG TbEC TAKE 2 TABLETS TWICE A DAY     No current facility-administered medications for this visit.      Review of Systems   Constitution: Negative for malaise/fatigue.   Eyes: Negative for blurred vision.   Cardiovascular: Negative for chest pain, claudication, dyspnea on exertion, irregular heartbeat, leg swelling, orthopnea, palpitations, paroxysmal nocturnal dyspnea and syncope.   Respiratory: Negative for shortness of breath.    Hematologic/Lymphatic: Negative for bleeding problem.   Skin: Negative for rash.   Musculoskeletal: Positive for arthritis and joint pain. Negative for myalgias.   Gastrointestinal: Negative for abdominal pain, constipation and nausea.   Genitourinary: Positive for nocturia.   Neurological: Negative for headaches, loss of balance and numbness.   Psychiatric/Behavioral: Negative for altered mental status.   Allergic/Immunologic: Negative for persistent infections.     Objective:     Right Arm BP - Sittin/92 (10/18/17 1303)  Left Arm BP - Sittin/82 (10/18/17 1402)    BP (!) 163/87 (BP Location: Left arm, Patient Position: Sitting, BP Method: Medium (Automatic))   Pulse 70   Ht 5' 11" (1.803 m)   Wt 81 kg (178 lb 9.2 oz)   BMI 24.91 kg/m²     Physical Exam   Constitutional: He is oriented to person, place, and time. He appears well-developed and well-nourished.   HENT:   Head: Normocephalic.   Nose: Nose normal.   Eyes: Pupils are equal, round, and reactive to light.   Neck: No JVD present. Carotid bruit is not present.   Cardiovascular: Normal rate, regular rhythm, S1 normal and S2 normal.  Exam reveals no gallop.  "   Murmur heard.  High-pitched blowing holosystolic murmur is present with a grade of 2/6  at the apex  Pulses:       Carotid pulses are 2+ on the right side, and 2+ on the left side.       Radial pulses are 2+ on the right side, and 2+ on the left side.        Dorsalis pedis pulses are 2+ on the right side, and 2+ on the left side.   Pulmonary/Chest: Breath sounds normal. No respiratory distress.   Abdominal: Soft. Bowel sounds are normal. He exhibits no distension. There is no tenderness.   Musculoskeletal: Normal range of motion. He exhibits no edema.   Neurological: He is alert and oriented to person, place, and time.   Skin: Skin is warm and dry. No erythema.   Psychiatric: He has a normal mood and affect. His speech is normal and behavior is normal.   Nursing note and vitals reviewed.    Lab Results   Component Value Date     09/21/2017    K 3.7 09/21/2017     09/21/2017    CO2 24 09/21/2017    BUN 20 09/21/2017    CREATININE 1.1 09/21/2017    GLU 95 09/21/2017    AST 24 09/21/2017    ALT 15 09/21/2017    ALBUMIN 3.7 09/21/2017    PROT 7.0 09/21/2017    BILITOT 0.7 09/21/2017    WBC 3.46 (L) 09/21/2017    HGB 12.4 (L) 09/21/2017    HCT 36.3 (L) 09/21/2017    MCV 97 09/21/2017     (L) 09/21/2017              Test(s) Reviewed  I have reviewed the following in detail:  [] Stress test   [] Angiography   [x] Echocardiogram   [x] Labs   [] Other:         Assessment:         1. Non-rheumatic mitral regurgitation. MR moderate per echo. Patient asymptomatic. Provided reassurance. Discussed signs and symptoms to watch for, especially MAZARIEGOS and decrease in activity tolerance. Encouraged patient to exercise regularly and control hypertension.      2. Essential hypertension. Elevated in clinic today with more control per past measures. Instructed patient to take BPs for 1-2 weeks and send measures to [facility] prior to making any medication adjustments if needed.      3. RA. Managed by Dr. Lee at  [facility].     4. History of prostate cancer.  Managed at [facility].     Plan:     Tom was seen today for establish care.    Diagnoses and all orders for this visit:    Rheumatoid arthritis involving multiple sites with positive rheumatoid factor    Non-rheumatic mitral regurgitation    Essential hypertension    History of prostate cancer      Take daily blood pressure measurements for one to two weeks. Send measurements to cardiology clinic using [facility] or by telephone.  Continue current medications.  Continue regular exercise. You are encouraged to exercise a minimum of 30 minutes daily, 5 times per week.   Return to clinic in one year, or sooner if symptoms develop.     Return in about 1 year (around 10/18/2018).    ------------------------------------------------------------------    JR Dunlap, NP-C  Consult Cardiology

## 2017-10-18 NOTE — PATIENT INSTRUCTIONS
Take daily blood pressure measurements for one to two weeks. Send measurements to cardiology clinic using [facility] or by telephone.  Continue current medications.  Continue regular exercise. You are encouraged to exercise a minimum of 30 minutes daily, 5 times per week.   Return to clinic in one year, or sooner if symptoms develop.

## 2017-10-19 ENCOUNTER — PATIENT MESSAGE (OUTPATIENT)
Dept: CARDIOLOGY | Facility: CLINIC | Age: 72
End: 2017-10-19

## 2017-10-20 ENCOUNTER — PATIENT MESSAGE (OUTPATIENT)
Dept: CARDIOLOGY | Facility: CLINIC | Age: 72
End: 2017-10-20

## 2017-10-21 ENCOUNTER — PATIENT MESSAGE (OUTPATIENT)
Dept: CARDIOLOGY | Facility: CLINIC | Age: 72
End: 2017-10-21

## 2017-10-22 ENCOUNTER — PATIENT MESSAGE (OUTPATIENT)
Dept: CARDIOLOGY | Facility: CLINIC | Age: 72
End: 2017-10-22

## 2017-10-25 ENCOUNTER — PATIENT MESSAGE (OUTPATIENT)
Dept: CARDIOLOGY | Facility: CLINIC | Age: 72
End: 2017-10-25

## 2017-10-25 ENCOUNTER — PATIENT MESSAGE (OUTPATIENT)
Dept: RHEUMATOLOGY | Facility: CLINIC | Age: 72
End: 2017-10-25

## 2017-10-26 ENCOUNTER — PATIENT MESSAGE (OUTPATIENT)
Dept: CARDIOLOGY | Facility: CLINIC | Age: 72
End: 2017-10-26

## 2017-10-26 RX ORDER — AZITHROMYCIN 1 G/1
1 POWDER, FOR SUSPENSION ORAL ONCE
Qty: 1 PACKET | Refills: 0 | Status: CANCELLED | OUTPATIENT
Start: 2017-10-26 | End: 2017-10-26

## 2017-10-26 RX ORDER — AZITHROMYCIN 250 MG/1
TABLET, FILM COATED ORAL
Qty: 6 TABLET | Refills: 2 | Status: SHIPPED | OUTPATIENT
Start: 2017-10-26 | End: 2017-10-31

## 2017-10-27 ENCOUNTER — PATIENT MESSAGE (OUTPATIENT)
Dept: CARDIOLOGY | Facility: CLINIC | Age: 72
End: 2017-10-27

## 2017-10-29 ENCOUNTER — PATIENT MESSAGE (OUTPATIENT)
Dept: CARDIOLOGY | Facility: CLINIC | Age: 72
End: 2017-10-29

## 2017-10-30 ENCOUNTER — PATIENT MESSAGE (OUTPATIENT)
Dept: CARDIOLOGY | Facility: CLINIC | Age: 72
End: 2017-10-30

## 2017-10-30 ENCOUNTER — PATIENT MESSAGE (OUTPATIENT)
Dept: RHEUMATOLOGY | Facility: CLINIC | Age: 72
End: 2017-10-30

## 2017-10-30 DIAGNOSIS — M05.79 RHEUMATOID ARTHRITIS INVOLVING MULTIPLE SITES WITH POSITIVE RHEUMATOID FACTOR: ICD-10-CM

## 2017-10-30 RX ORDER — METHOTREXATE 2.5 MG/1
10 TABLET ORAL
Qty: 52 TABLET | Refills: 0 | Status: SHIPPED | OUTPATIENT
Start: 2017-10-30 | End: 2018-01-12 | Stop reason: SDUPTHER

## 2017-10-31 ENCOUNTER — PATIENT MESSAGE (OUTPATIENT)
Dept: CARDIOLOGY | Facility: CLINIC | Age: 72
End: 2017-10-31

## 2017-11-02 ENCOUNTER — PATIENT MESSAGE (OUTPATIENT)
Dept: CARDIOLOGY | Facility: CLINIC | Age: 72
End: 2017-11-02

## 2017-11-03 ENCOUNTER — PATIENT MESSAGE (OUTPATIENT)
Dept: CARDIOLOGY | Facility: CLINIC | Age: 72
End: 2017-11-03

## 2017-11-05 ENCOUNTER — PATIENT MESSAGE (OUTPATIENT)
Dept: CARDIOLOGY | Facility: CLINIC | Age: 72
End: 2017-11-05

## 2017-11-06 ENCOUNTER — PATIENT MESSAGE (OUTPATIENT)
Dept: CARDIOLOGY | Facility: CLINIC | Age: 72
End: 2017-11-06

## 2017-11-07 ENCOUNTER — PATIENT MESSAGE (OUTPATIENT)
Dept: CARDIOLOGY | Facility: CLINIC | Age: 72
End: 2017-11-07

## 2017-11-13 ENCOUNTER — PATIENT MESSAGE (OUTPATIENT)
Dept: CARDIOLOGY | Facility: CLINIC | Age: 72
End: 2017-11-13

## 2017-11-27 ENCOUNTER — PATIENT MESSAGE (OUTPATIENT)
Dept: RHEUMATOLOGY | Facility: CLINIC | Age: 72
End: 2017-11-27

## 2017-11-27 DIAGNOSIS — M05.79 RHEUMATOID ARTHRITIS INVOLVING MULTIPLE SITES WITH POSITIVE RHEUMATOID FACTOR: ICD-10-CM

## 2017-11-27 RX ORDER — HYDROXYCHLOROQUINE SULFATE 200 MG/1
200 TABLET, FILM COATED ORAL 2 TIMES DAILY
Qty: 60 TABLET | Refills: 3 | Status: SHIPPED | OUTPATIENT
Start: 2017-11-27 | End: 2017-11-27 | Stop reason: SDUPTHER

## 2017-11-27 RX ORDER — HYDROXYCHLOROQUINE SULFATE 200 MG/1
200 TABLET, FILM COATED ORAL 2 TIMES DAILY
Qty: 180 TABLET | Refills: 1 | Status: SHIPPED | OUTPATIENT
Start: 2017-11-27 | End: 2018-08-06 | Stop reason: SDUPTHER

## 2018-01-12 DIAGNOSIS — M05.79 RHEUMATOID ARTHRITIS INVOLVING MULTIPLE SITES WITH POSITIVE RHEUMATOID FACTOR: ICD-10-CM

## 2018-01-12 RX ORDER — METHOTREXATE 2.5 MG/1
TABLET ORAL
Qty: 52 TABLET | Refills: 0 | Status: SHIPPED | OUTPATIENT
Start: 2018-01-12 | End: 2018-04-17 | Stop reason: SDUPTHER

## 2018-03-02 ENCOUNTER — PATIENT MESSAGE (OUTPATIENT)
Dept: RHEUMATOLOGY | Facility: CLINIC | Age: 73
End: 2018-03-02

## 2018-03-02 DIAGNOSIS — B96.89 ACUTE BACTERIAL RHINOSINUSITIS: Primary | ICD-10-CM

## 2018-03-02 DIAGNOSIS — J01.90 ACUTE BACTERIAL RHINOSINUSITIS: Primary | ICD-10-CM

## 2018-03-06 ENCOUNTER — PATIENT MESSAGE (OUTPATIENT)
Dept: RHEUMATOLOGY | Facility: CLINIC | Age: 73
End: 2018-03-06

## 2018-03-06 RX ORDER — AZITHROMYCIN 250 MG/1
TABLET, FILM COATED ORAL
COMMUNITY
Start: 2018-02-27 | End: 2018-03-06 | Stop reason: SDUPTHER

## 2018-03-06 RX ORDER — AZITHROMYCIN 250 MG/1
250 TABLET, FILM COATED ORAL DAILY
Qty: 6 TABLET | Refills: 2 | Status: SHIPPED | OUTPATIENT
Start: 2018-03-06 | End: 2018-10-09 | Stop reason: ALTCHOICE

## 2018-03-06 RX ORDER — CHLORZOXAZONE 500 MG/1
TABLET ORAL
COMMUNITY
Start: 2017-11-28 | End: 2018-03-06

## 2018-04-01 DIAGNOSIS — M05.79 RHEUMATOID ARTHRITIS INVOLVING MULTIPLE SITES WITH POSITIVE RHEUMATOID FACTOR: ICD-10-CM

## 2018-04-02 RX ORDER — SULFASALAZINE 500 MG/1
TABLET, DELAYED RELEASE ORAL
Qty: 360 TABLET | Refills: 0 | Status: SHIPPED | OUTPATIENT
Start: 2018-04-02 | End: 2018-06-11 | Stop reason: SDUPTHER

## 2018-04-12 DIAGNOSIS — M05.79 RHEUMATOID ARTHRITIS INVOLVING MULTIPLE SITES WITH POSITIVE RHEUMATOID FACTOR: ICD-10-CM

## 2018-04-12 DIAGNOSIS — Z79.631 METHOTREXATE, LONG TERM, CURRENT USE: Primary | ICD-10-CM

## 2018-04-12 RX ORDER — METHOTREXATE 2.5 MG/1
TABLET ORAL
Qty: 52 TABLET | Refills: 0 | Status: CANCELLED | OUTPATIENT
Start: 2018-04-12

## 2018-04-17 ENCOUNTER — LAB VISIT (OUTPATIENT)
Dept: LAB | Facility: OTHER | Age: 73
End: 2018-04-17
Payer: MEDICARE

## 2018-04-17 DIAGNOSIS — M05.79 RHEUMATOID ARTHRITIS INVOLVING MULTIPLE SITES WITH POSITIVE RHEUMATOID FACTOR: ICD-10-CM

## 2018-04-17 DIAGNOSIS — Z79.631 METHOTREXATE, LONG TERM, CURRENT USE: ICD-10-CM

## 2018-04-17 LAB
ALBUMIN SERPL BCP-MCNC: 4.1 G/DL
ALP SERPL-CCNC: 65 U/L
ALT SERPL W/O P-5'-P-CCNC: 21 U/L
ANION GAP SERPL CALC-SCNC: 9 MMOL/L
AST SERPL-CCNC: 22 U/L
BASOPHILS # BLD AUTO: 0.02 K/UL
BASOPHILS NFR BLD: 0.5 %
BILIRUB SERPL-MCNC: 0.6 MG/DL
BUN SERPL-MCNC: 28 MG/DL
CALCIUM SERPL-MCNC: 9.3 MG/DL
CHLORIDE SERPL-SCNC: 104 MMOL/L
CO2 SERPL-SCNC: 26 MMOL/L
CREAT SERPL-MCNC: 1 MG/DL
CRP SERPL-MCNC: 1.3 MG/L
DIFFERENTIAL METHOD: ABNORMAL
EOSINOPHIL # BLD AUTO: 0.1 K/UL
EOSINOPHIL NFR BLD: 1.4 %
ERYTHROCYTE [DISTWIDTH] IN BLOOD BY AUTOMATED COUNT: 13.8 %
ERYTHROCYTE [SEDIMENTATION RATE] IN BLOOD BY WESTERGREN METHOD: 4 MM/HR
EST. GFR  (AFRICAN AMERICAN): >60 ML/MIN/1.73 M^2
EST. GFR  (NON AFRICAN AMERICAN): >60 ML/MIN/1.73 M^2
GLUCOSE SERPL-MCNC: 64 MG/DL
HCT VFR BLD AUTO: 42.3 %
HGB BLD-MCNC: 14.1 G/DL
LYMPHOCYTES # BLD AUTO: 0.9 K/UL
LYMPHOCYTES NFR BLD: 20.3 %
MCH RBC QN AUTO: 33.2 PG
MCHC RBC AUTO-ENTMCNC: 33.3 G/DL
MCV RBC AUTO: 100 FL
MONOCYTES # BLD AUTO: 0.4 K/UL
MONOCYTES NFR BLD: 9.3 %
NEUTROPHILS # BLD AUTO: 2.9 K/UL
NEUTROPHILS NFR BLD: 68.3 %
PLATELET # BLD AUTO: 142 K/UL
PMV BLD AUTO: 10.4 FL
POTASSIUM SERPL-SCNC: 4.6 MMOL/L
PROT SERPL-MCNC: 7.2 G/DL
RBC # BLD AUTO: 4.25 M/UL
SODIUM SERPL-SCNC: 139 MMOL/L
WBC # BLD AUTO: 4.19 K/UL

## 2018-04-17 PROCEDURE — 85651 RBC SED RATE NONAUTOMATED: CPT

## 2018-04-17 PROCEDURE — 85025 COMPLETE CBC W/AUTO DIFF WBC: CPT

## 2018-04-17 PROCEDURE — 86140 C-REACTIVE PROTEIN: CPT

## 2018-04-17 PROCEDURE — 36415 COLL VENOUS BLD VENIPUNCTURE: CPT

## 2018-04-17 PROCEDURE — 80053 COMPREHEN METABOLIC PANEL: CPT

## 2018-04-17 RX ORDER — METHOTREXATE 2.5 MG/1
TABLET ORAL
Qty: 52 TABLET | Refills: 0 | Status: SHIPPED | OUTPATIENT
Start: 2018-04-17 | End: 2018-07-16 | Stop reason: SDUPTHER

## 2018-05-31 DIAGNOSIS — M35.01 SJOGREN'S SYNDROME WITH KERATOCONJUNCTIVITIS SICCA: ICD-10-CM

## 2018-05-31 RX ORDER — CEVIMELINE HYDROCHLORIDE 30 MG/1
30 CAPSULE ORAL 3 TIMES DAILY
Qty: 180 CAPSULE | Refills: 3 | Status: SHIPPED | OUTPATIENT
Start: 2018-05-31 | End: 2018-12-11 | Stop reason: SDUPTHER

## 2018-06-11 DIAGNOSIS — M05.79 RHEUMATOID ARTHRITIS INVOLVING MULTIPLE SITES WITH POSITIVE RHEUMATOID FACTOR: ICD-10-CM

## 2018-06-12 RX ORDER — SULFASALAZINE 500 MG/1
TABLET, DELAYED RELEASE ORAL
Qty: 360 TABLET | Refills: 0 | Status: SHIPPED | OUTPATIENT
Start: 2018-06-12 | End: 2018-08-19 | Stop reason: SDUPTHER

## 2018-07-16 DIAGNOSIS — M05.79 RHEUMATOID ARTHRITIS INVOLVING MULTIPLE SITES WITH POSITIVE RHEUMATOID FACTOR: ICD-10-CM

## 2018-07-16 RX ORDER — METHOTREXATE 2.5 MG/1
TABLET ORAL
Qty: 52 TABLET | Refills: 0 | Status: SHIPPED | OUTPATIENT
Start: 2018-07-16 | End: 2018-10-14 | Stop reason: SDUPTHER

## 2018-08-05 ENCOUNTER — PATIENT MESSAGE (OUTPATIENT)
Dept: RHEUMATOLOGY | Facility: CLINIC | Age: 73
End: 2018-08-05

## 2018-08-05 DIAGNOSIS — M05.79 RHEUMATOID ARTHRITIS INVOLVING MULTIPLE SITES WITH POSITIVE RHEUMATOID FACTOR: ICD-10-CM

## 2018-08-06 RX ORDER — HYDROXYCHLOROQUINE SULFATE 200 MG/1
200 TABLET, FILM COATED ORAL 2 TIMES DAILY
Qty: 180 TABLET | Refills: 0 | Status: SHIPPED | OUTPATIENT
Start: 2018-08-06 | End: 2018-10-16 | Stop reason: SDUPTHER

## 2018-08-19 DIAGNOSIS — M05.79 RHEUMATOID ARTHRITIS INVOLVING MULTIPLE SITES WITH POSITIVE RHEUMATOID FACTOR: ICD-10-CM

## 2018-08-20 RX ORDER — SULFASALAZINE 500 MG/1
TABLET, DELAYED RELEASE ORAL
Qty: 360 TABLET | Refills: 0 | Status: SHIPPED | OUTPATIENT
Start: 2018-08-20 | End: 2018-11-18 | Stop reason: SDUPTHER

## 2018-09-07 ENCOUNTER — OFFICE VISIT (OUTPATIENT)
Dept: RHEUMATOLOGY | Facility: CLINIC | Age: 73
End: 2018-09-07
Payer: MEDICARE

## 2018-09-07 ENCOUNTER — LAB VISIT (OUTPATIENT)
Dept: LAB | Facility: HOSPITAL | Age: 73
End: 2018-09-07
Attending: INTERNAL MEDICINE
Payer: MEDICARE

## 2018-09-07 VITALS
HEIGHT: 71 IN | WEIGHT: 181.19 LBS | BODY MASS INDEX: 25.37 KG/M2 | SYSTOLIC BLOOD PRESSURE: 144 MMHG | DIASTOLIC BLOOD PRESSURE: 70 MMHG

## 2018-09-07 DIAGNOSIS — Z79.631 METHOTREXATE, LONG TERM, CURRENT USE: ICD-10-CM

## 2018-09-07 DIAGNOSIS — M05.79 RHEUMATOID ARTHRITIS INVOLVING MULTIPLE SITES WITH POSITIVE RHEUMATOID FACTOR: Chronic | ICD-10-CM

## 2018-09-07 DIAGNOSIS — M05.79 RHEUMATOID ARTHRITIS INVOLVING MULTIPLE SITES WITH POSITIVE RHEUMATOID FACTOR: ICD-10-CM

## 2018-09-07 DIAGNOSIS — I34.0 NON-RHEUMATIC MITRAL REGURGITATION: ICD-10-CM

## 2018-09-07 DIAGNOSIS — Z79.899 LONG-TERM USE OF PLAQUENIL: ICD-10-CM

## 2018-09-07 DIAGNOSIS — M35.01 SJOGREN'S SYNDROME WITH KERATOCONJUNCTIVITIS SICCA: Chronic | ICD-10-CM

## 2018-09-07 DIAGNOSIS — I10 ESSENTIAL HYPERTENSION: ICD-10-CM

## 2018-09-07 LAB
ALBUMIN SERPL BCP-MCNC: 4.2 G/DL
ALP SERPL-CCNC: 64 U/L
ALT SERPL W/O P-5'-P-CCNC: 19 U/L
ANION GAP SERPL CALC-SCNC: 8 MMOL/L
AST SERPL-CCNC: 28 U/L
BASOPHILS # BLD AUTO: 0.03 K/UL
BASOPHILS NFR BLD: 0.6 %
BILIRUB SERPL-MCNC: 0.7 MG/DL
BUN SERPL-MCNC: 30 MG/DL
CALCIUM SERPL-MCNC: 9.6 MG/DL
CHLORIDE SERPL-SCNC: 105 MMOL/L
CO2 SERPL-SCNC: 28 MMOL/L
CREAT SERPL-MCNC: 1.1 MG/DL
CRP SERPL-MCNC: 1.7 MG/L
DIFFERENTIAL METHOD: ABNORMAL
EOSINOPHIL # BLD AUTO: 0.1 K/UL
EOSINOPHIL NFR BLD: 1.9 %
ERYTHROCYTE [DISTWIDTH] IN BLOOD BY AUTOMATED COUNT: 13.3 %
ERYTHROCYTE [SEDIMENTATION RATE] IN BLOOD BY WESTERGREN METHOD: <2 MM/HR
EST. GFR  (AFRICAN AMERICAN): >60 ML/MIN/1.73 M^2
EST. GFR  (NON AFRICAN AMERICAN): >60 ML/MIN/1.73 M^2
GLUCOSE SERPL-MCNC: 83 MG/DL
HCT VFR BLD AUTO: 40.2 %
HGB BLD-MCNC: 13.5 G/DL
IMM GRANULOCYTES # BLD AUTO: 0.02 K/UL
IMM GRANULOCYTES NFR BLD AUTO: 0.4 %
LYMPHOCYTES # BLD AUTO: 1 K/UL
LYMPHOCYTES NFR BLD: 18.8 %
MCH RBC QN AUTO: 34 PG
MCHC RBC AUTO-ENTMCNC: 33.6 G/DL
MCV RBC AUTO: 101 FL
MONOCYTES # BLD AUTO: 0.6 K/UL
MONOCYTES NFR BLD: 11.3 %
NEUTROPHILS # BLD AUTO: 3.5 K/UL
NEUTROPHILS NFR BLD: 67 %
NRBC BLD-RTO: 0 /100 WBC
PLATELET # BLD AUTO: 133 K/UL
PMV BLD AUTO: 9.9 FL
POTASSIUM SERPL-SCNC: 4.1 MMOL/L
PROT SERPL-MCNC: 7.1 G/DL
RBC # BLD AUTO: 3.97 M/UL
SODIUM SERPL-SCNC: 141 MMOL/L
WBC # BLD AUTO: 5.15 K/UL

## 2018-09-07 PROCEDURE — 85025 COMPLETE CBC W/AUTO DIFF WBC: CPT

## 2018-09-07 PROCEDURE — 99214 OFFICE O/P EST MOD 30 MIN: CPT | Mod: S$PBB,,, | Performed by: INTERNAL MEDICINE

## 2018-09-07 PROCEDURE — 99999 PR PBB SHADOW E&M-EST. PATIENT-LVL III: CPT | Mod: PBBFAC,,, | Performed by: INTERNAL MEDICINE

## 2018-09-07 PROCEDURE — 86140 C-REACTIVE PROTEIN: CPT

## 2018-09-07 PROCEDURE — 80053 COMPREHEN METABOLIC PANEL: CPT

## 2018-09-07 PROCEDURE — 36415 COLL VENOUS BLD VENIPUNCTURE: CPT

## 2018-09-07 PROCEDURE — 85652 RBC SED RATE AUTOMATED: CPT

## 2018-09-07 PROCEDURE — 99213 OFFICE O/P EST LOW 20 MIN: CPT | Mod: PBBFAC | Performed by: INTERNAL MEDICINE

## 2018-09-07 RX ORDER — FOLIC ACID 1 MG/1
TABLET ORAL
Qty: 180 TABLET | Refills: 3 | Status: SHIPPED | OUTPATIENT
Start: 2018-09-07 | End: 2019-09-02 | Stop reason: SDUPTHER

## 2018-09-07 ASSESSMENT — ROUTINE ASSESSMENT OF PATIENT INDEX DATA (RAPID3)
FATIGUE SCORE: .5
MDHAQ FUNCTION SCORE: .1
PAIN SCORE: 0
PSYCHOLOGICAL DISTRESS SCORE: 1.1
PATIENT GLOBAL ASSESSMENT SCORE: 1
TOTAL RAPID3 SCORE: .44
AM STIFFNESS SCORE: 1, YES
WHEN YOU AWAKENED IN THE MORNING OVER THE LAST WEEK, PLEASE INDICATE THE AMOUNT OF TIME IT TAKES UNTIL YOU ARE AS LIMBER AS YOU WILL BE FOR THE DAY: 1 HOUR

## 2018-09-07 NOTE — PROGRESS NOTES
Rapid3 Question Responses and Scores 9/4/2018   MDHAQ Score 0.1   Psychologic Score 1.1   Pain Score 0   When you awakened in the morning OVER THE LAST WEEK, did you feel stiff? Yes   If Yes, please indicate the number of hours until you are as limber as you will be for the day 1   Fatigue Score 0.5   Global Health Score 1   RAPID3 Score 0.44       Answers for HPI/ROS submitted by the patient on 9/4/2018   fever: No  eye redness: No  headaches: No  shortness of breath: No  chest pain: No  trouble swallowing: No  diarrhea: No  constipation: Yes  unexpected weight change: No  genital sore: No  During the last 3 days, have you had a skin rash?: No  Bruises or bleeds easily: No  cough: No

## 2018-09-07 NOTE — ASSESSMENT & PLAN NOTE
Doing well on triple therapy with no interval changes  Needs lab today and in 3 m but can then f/u 6 mo if no interval issues

## 2018-09-07 NOTE — PROGRESS NOTES
"Subjective:       Patient ID: Tom Sutherland is a 73 y.o. male.    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   HENT: Negative for trouble swallowing.    Eyes: Negative for redness.   Respiratory: Negative for cough and shortness of breath.    Cardiovascular: Negative for chest pain.   Gastrointestinal: Positive for constipation. Negative for diarrhea.   Genitourinary: Negative for genital sores.   Skin: Negative for rash.   Neurological: Negative for headaches.   Hematological: Does not bruise/bleed easily.         Objective:   BP (!) 144/70 (BP Location: Left arm)   Ht 5' 11" (1.803 m)   Wt 82.2 kg (181 lb 3.2 oz)   BMI 25.27 kg/m²      Physical Exam   Constitutional: He is well-developed, well-nourished, and in no distress.   HENT:   Mouth/Throat: No oropharyngeal exudate.   Eyes: No scleral icterus.   Cardiovascular: Regular rhythm.    Bigeminy on auscultation   Pulmonary/Chest: He has no wheezes. He has no rales.   Abdominal: There is no tenderness.   Lymphadenopathy:     He has no cervical adenopathy.   Skin: No rash noted.     Musculoskeletal:   Trace swelling, trace tenderness, R 2 and 3 MCP          Physical Exam     Tenderness:   Right hand: 2nd MCP and 3rd MCP    Swelling:   Right hand: 2nd MCP and 3rd MCP    LOVE-28 tender joint count: 2  LOVE-28 swollen joint count: 2  ESR (mm/hr): 2  Patient global assessment: 10  LOVE-28 score: 1.81 (Remission)    Lab Results   Component Value Date    SEDRATE <2 09/07/2018 "          Assessment:       1. Rheumatoid arthritis involving multiple sites with positive rheumatoid factor    2. Sjogren's syndrome with keratoconjunctivitis sicca    3. Methotrexate, long term, current use    4. Long-term use of Plaquenil    5. Essential hypertension    6. Non-rheumatic mitral regurgitation            Plan:       Problem List Items Addressed This Visit        Active Problems    Essential hypertension     sBP 144 today; he will discuss with Dr Porter         Long-term use of Plaquenil     Has appt with Dr. Dominguez         Methotrexate, long term, current use     Needs lab         Non-rheumatic mitral regurgitation     Bigeminy on exam today         Rheumatoid arthritis involving multiple sites with positive rheumatoid factor (Chronic)     Doing well on triple therapy with no interval changes  Needs lab today and in 3 m but can then f/u 6 mo if no interval issues         Sjogren's syndrome (Chronic)     Not dry

## 2018-09-19 ENCOUNTER — OFFICE VISIT (OUTPATIENT)
Dept: OPTOMETRY | Facility: CLINIC | Age: 73
End: 2018-09-19
Payer: MEDICARE

## 2018-09-19 ENCOUNTER — TELEPHONE (OUTPATIENT)
Dept: OPHTHALMOLOGY | Facility: CLINIC | Age: 73
End: 2018-09-19

## 2018-09-19 DIAGNOSIS — H25.13 NUCLEAR SCLEROSIS OF BOTH EYES: ICD-10-CM

## 2018-09-19 DIAGNOSIS — H52.223 REGULAR ASTIGMATISM, BILATERAL: ICD-10-CM

## 2018-09-19 DIAGNOSIS — H52.4 PRESBYOPIA: ICD-10-CM

## 2018-09-19 DIAGNOSIS — Z79.899 HISTORY OF ONGOING TREATMENT WITH HIGH-RISK MEDICATION: ICD-10-CM

## 2018-09-19 DIAGNOSIS — M35.00 SJOGREN'S SYNDROME, WITH UNSPECIFIED ORGAN INVOLVEMENT: Chronic | ICD-10-CM

## 2018-09-19 DIAGNOSIS — Z79.899 LONG-TERM USE OF PLAQUENIL: ICD-10-CM

## 2018-09-19 DIAGNOSIS — M06.9 RHEUMATOID ARTHRITIS OF HAND, UNSPECIFIED LATERALITY, UNSPECIFIED RHEUMATOID FACTOR PRESENCE: Primary | ICD-10-CM

## 2018-09-19 PROCEDURE — 99999 PR PBB SHADOW E&M-EST. PATIENT-LVL III: CPT | Mod: PBBFAC,,, | Performed by: OPTOMETRIST

## 2018-09-19 PROCEDURE — 99213 OFFICE O/P EST LOW 20 MIN: CPT | Mod: PBBFAC | Performed by: OPTOMETRIST

## 2018-09-19 PROCEDURE — 92015 DETERMINE REFRACTIVE STATE: CPT | Mod: ,,, | Performed by: OPTOMETRIST

## 2018-09-19 PROCEDURE — 92134 CPTRZ OPH DX IMG PST SGM RTA: CPT | Mod: PBBFAC | Performed by: OPTOMETRIST

## 2018-09-19 PROCEDURE — 92014 COMPRE OPH EXAM EST PT 1/>: CPT | Mod: S$PBB,,, | Performed by: OPTOMETRIST

## 2018-09-19 RX ORDER — MULTIVITAMIN
1 TABLET ORAL DAILY
COMMUNITY

## 2018-09-19 NOTE — PATIENT INSTRUCTIONS
History of Plaquenil use x 5 years.  No evidence of Plaquenil toxicity in either eye.  Repeat OCT of retina at macula done today  No evidence of abnormality of foveal contour on OCT in either eye.  No evidence of pigmentary changes at macula/posterior pole secondary to Plaquenil use in either on previous visit.  Due for repeat Wylie Visual Field (HVF) test (10-2)  Assistant to call to schedule HVF test and no-charge visit with me (Dr. Dominguez) on the same date.    In the interim, okay to continue Plaquenil use under supervision of rheumatologist.     Nuclear sclerosis of lens of both eyes, consistent with age.  No need for cataract surgery.     Astigmatic refractive error in each eye, with very satisfactory correctable VA in each eye.  Presbyopia consistent with age.  New spectacle lens Rx issued for use as desired.  Recommend full-time wear.      Repeat refraction in one year.

## 2018-09-19 NOTE — PROGRESS NOTES
HPI     Concerns About Ocular Health      Additional comments: here for annual exam  takes Plaquenil (two per day) for rheumatoid arthritis. x 6 years.   Happy with current glasses.   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 WM  Occupation: reads to the blind. Former teacher  Approximate date of last eye examination:  9/13/17  Name of last eye doctor seen: Dr Dominguez  City/State: [place]  Wears glasses? yes     If yes, wears  Full-time or part-time?  Full-time  Present glasses are: Bifocal, SV Distance, SV Reading?  bifocals  Approximate age of present glasses:  1 year   Got new glasses following last exam, or subsequently?:  yes   Any problem with VA with glasses?  no  Wears CLs?:  no  Headaches?  no  Eye pain/discomfort?  no                                                                                     Flashes?  no  Floaters?  no  Diplopia/Double vision?  no  Patient's Ocular History:         Any eye surgeries? no         Any eye injury?  no         Any treatment for eye disease?  No -  takes Plaquenil, and   Methotrexate.  Family history of eye disease?  no  Significant patient medical history:         1. Diabetes?  no       If yes, IDDM or NIDDM? na   2. HBP?  yes              3. Other (describe):  RA   ! OTC eyedrops currently using:  no   ! Prescription eye meds currently using:  Patanol prn   ! Any history of allergy/adverse reaction to any eye meds used   previously?  no    ! Any history of allergy/adverse reaction to eyedrops used during prior   eye exam(s)? no    ! Any history of allergy/adverse reaction to Novacaine or similar meds?   no   ! Any history of allergy/adverse reaction to Epinephrine or similar meds?   no    ! Patient okay with use of anesthetic eyedrops to check eye pressure?    yes        ! Patient okay with use of eyedrops to dilate pupils today?  yes   !  Allergies/Medications/Medical History/Family History reviewed today?   "  yes      PD =   64/60  Desired reading distance =  19.5"                                                                     Last edited by Dudley Dominguez, OD on 9/19/2018 11:54 AM. (History)            Assessment /Plan     For exam results, see Encounter Report.    1. Rheumatoid arthritis of hand, unspecified laterality, unspecified rheumatoid factor presence  Posterior Segment OCT Retina-Both eyes    Wylie Visual Field - OU - Extended - Both Eyes   2. Long-term use of Plaquenil  Posterior Segment OCT Retina-Both eyes    Wylie Visual Field - OU - Extended - Both Eyes   3. History of ongoing treatment with high-risk medication  Posterior Segment OCT Retina-Both eyes    Wylie Visual Field - OU - Extended - Both Eyes   4. Sjogren's syndrome, with unspecified organ involvement     5. Nuclear sclerosis of both eyes     6. Regular astigmatism, bilateral     7. Presbyopia                   History of Plaquenil use x 5 years.  No evidence of Plaquenil toxicity in either eye.  Repeat OCT of retina at macula done today  No evidence of abnormality of foveal contour on OCT in either eye.  No evidence of pigmentary changes at macula/posterior pole secondary to Plaquenil use in either on previous visit.  Due for repeat Wylie Visual Field (HVF) test (10-2)  Assistant to call to schedule HVF test and no-charge visit with me (Dr. Dominguez) on the same date.    In the interim, okay to continue Plaquenil use under supervision of rheumatologist.     Nuclear sclerosis of lens of both eyes, consistent with age.  No need for cataract surgery.     Astigmatic refractive error in each eye, with very satisfactory correctable [facility] in each eye.  Presbyopia consistent with age.  New spectacle lens Rx issued for use as desired.  Recommend full-time wear.      Repeat refraction in one year.           "

## 2018-10-09 ENCOUNTER — OFFICE VISIT (OUTPATIENT)
Dept: CARDIOLOGY | Facility: CLINIC | Age: 73
End: 2018-10-09
Payer: MEDICARE

## 2018-10-09 VITALS
DIASTOLIC BLOOD PRESSURE: 74 MMHG | BODY MASS INDEX: 25.12 KG/M2 | HEIGHT: 71 IN | HEART RATE: 76 BPM | WEIGHT: 179.44 LBS | SYSTOLIC BLOOD PRESSURE: 111 MMHG

## 2018-10-09 DIAGNOSIS — I10 ESSENTIAL HYPERTENSION: ICD-10-CM

## 2018-10-09 DIAGNOSIS — Z79.899 LONG-TERM USE OF PLAQUENIL: ICD-10-CM

## 2018-10-09 DIAGNOSIS — I34.0 NON-RHEUMATIC MITRAL REGURGITATION: Primary | ICD-10-CM

## 2018-10-09 DIAGNOSIS — Z79.631 METHOTREXATE, LONG TERM, CURRENT USE: ICD-10-CM

## 2018-10-09 DIAGNOSIS — M35.00 SJOGREN'S SYNDROME, WITH UNSPECIFIED ORGAN INVOLVEMENT: Chronic | ICD-10-CM

## 2018-10-09 DIAGNOSIS — M05.79 RHEUMATOID ARTHRITIS INVOLVING MULTIPLE SITES WITH POSITIVE RHEUMATOID FACTOR: Chronic | ICD-10-CM

## 2018-10-09 PROCEDURE — 99999 PR PBB SHADOW E&M-EST. PATIENT-LVL III: CPT | Mod: PBBFAC,,, | Performed by: INTERNAL MEDICINE

## 2018-10-09 PROCEDURE — 99213 OFFICE O/P EST LOW 20 MIN: CPT | Mod: PBBFAC | Performed by: INTERNAL MEDICINE

## 2018-10-09 PROCEDURE — 99214 OFFICE O/P EST MOD 30 MIN: CPT | Mod: S$PBB,,, | Performed by: INTERNAL MEDICINE

## 2018-10-09 NOTE — PROGRESS NOTES
Subjective:   Patient ID:  Tom Sutherland is a 73 y.o. male who presents for follow up of Rheumatoid arthritis involving multiple sites with positive  (1 yr fu); Non-rheumatic mitral regurgitation; and Essential hypertension      HPI: Routine yearly f/u.  Walking 18 blocks with his dog and having no problems at all.  RA control has been good too.  Blood pressure at home has been normal.    He is doing well with no new symptoms or cardiovascular complaints and no change in exercise capacity.  He denies chest discomfort, MAZARIEGOS, palpitations, PND/orthopnea, lightheadedness and syncope.      Prior HPI (Oct 2017, [place]) Mr. Sutherland is a 73yo male with HTN, RA, Sjogren, history of prostate CA here for evaluation of mitral regurgitation. Murmur over the apex was heard during a pre-operative clearance prior to prostate resection surgery at [facility], which resulted in an echo that indicated normal EF, moderate-severe MR, normal IVC, enlarged LA, normal LV. Exercise stress test was negative for ischemia. Today Mr. Sutherland is here to establish care. He has no cardiac complaints. Mr. Sutherland denies chest pain with exertion or at rest, palpitations, SOB, MAZARIEGOS, dizziness, syncope, leg edema, claudication, PND, or orthopnea. He is active and walks with his wife for miles without MAZARIEGOS. He can easily ascend several flights of stairs without stopping for dyspnea. He experiences no limitations in his activity tolerance.  He is currently taking ASA 81mg and losartan-HCTZ 50-12.5. He does not take his BP at home but does have a cuff. Repeat BP was 158/82, but other clinic measures show most pressures under 140/90. Creatinine is 1.1 on 9/21/2017. Hepatic transaminases are within normal limits.    Patient Active Problem List   Diagnosis    Rheumatoid arthritis involving multiple sites with positive rheumatoid factor    Methotrexate, long term, current use    Long-term use of Plaquenil    Sjogren's syndrome    Non-rheumatic mitral  regurgitation    Essential hypertension    History of prostate cancer       Current Outpatient Medications   Medication Sig    aspirin (ECOTRIN) 81 MG EC tablet Take 81 mg by mouth once daily.    cevimeline (EVOXAC) 30 mg capsule Take 1 capsule (30 mg total) by mouth 3 (three) times daily.    CIALIS 5 mg tablet TK 1 T PO QD    fluticasone (FLONASE) 50 mcg/actuation nasal spray daily as needed.     folic acid (FOLVITE) 1 MG tablet TAKE 2 TABLETS ONCE DAILY    hydroxychloroquine (PLAQUENIL) 200 mg tablet Take 1 tablet (200 mg total) by mouth 2 (two) times daily.    leucovorin (WELLCOVORIN) 5 mg Tab Take 1 tablet (5 mg total) by mouth once daily.    losartan-hydrochlorothiazide 50-12.5 mg (HYZAAR) 50-12.5 mg per tablet Take 1 tablet by mouth once daily.     meloxicam (MOBIC) 7.5 MG tablet Take 1 tablet (7.5 mg total) by mouth once daily.    methotrexate 2.5 MG Tab TAKE 4 TABLETS EVERY 7 DAYS    multivitamin (ONE DAILY MULTIVITAMIN) per tablet Take 1 tablet by mouth once daily.    PATANOL 0.1 % ophthalmic solution daily as needed.     ranitidine (ZANTAC) 150 MG tablet Take 150 mg by mouth 2 (two) times daily.     sulfaSALAzine (AZULFIDINE) 500 MG TbEC TAKE 2 TABLETS TWICE A DAY     No current facility-administered medications for this visit.        Review of Systems   Constitution: Negative.   HENT: Negative.    Eyes: Negative.    Cardiovascular: Negative.  Negative for chest pain, dyspnea on exertion, near-syncope, orthopnea and palpitations.   Respiratory: Negative.  Negative for cough, hemoptysis and shortness of breath.    Endocrine: Negative.    Hematologic/Lymphatic: Negative.    Skin: Negative.    Musculoskeletal: Negative.    Gastrointestinal: Negative.    Genitourinary: Negative.    Neurological: Negative.    Psychiatric/Behavioral: Negative.      Objective:   Physical Exam   Constitutional: He is oriented to person, place, and time. He appears well-developed and well-nourished.   HENT:   Head:  Normocephalic and atraumatic.   Mouth/Throat: Oropharynx is clear and moist.   Eyes: Conjunctivae and EOM are normal. No scleral icterus.   Neck: Normal range of motion. Neck supple. No JVD present.   Cardiovascular: Normal rate, regular rhythm and intact distal pulses. Exam reveals no gallop and no friction rub.   Murmur (Very soft murmur heard diffusely in the left chest) heard.  Pulmonary/Chest: Effort normal and breath sounds normal. He has no wheezes. He has no rales.   Abdominal: Soft. Bowel sounds are normal. He exhibits no distension. There is no tenderness.   Musculoskeletal: Normal range of motion. He exhibits no edema.   Neurological: He is alert and oriented to person, place, and time.   Skin: Skin is warm and dry. No rash noted. No erythema.   Psychiatric: He has a normal mood and affect. His behavior is normal. Judgment and thought content normal.   Vitals reviewed.      Lab Results   Component Value Date    WBC 5.15 09/07/2018    HGB 13.5 (L) 09/07/2018    HCT 40.2 09/07/2018     (H) 09/07/2018     (L) 09/07/2018         Chemistry        Component Value Date/Time     09/07/2018 1059    K 4.1 09/07/2018 1059     09/07/2018 1059    CO2 28 09/07/2018 1059    BUN 30 (H) 09/07/2018 1059    CREATININE 1.1 09/07/2018 1059    GLU 83 09/07/2018 1059        Component Value Date/Time    CALCIUM 9.6 09/07/2018 1059    ALKPHOS 64 09/07/2018 1059    AST 28 09/07/2018 1059    ALT 19 09/07/2018 1059    BILITOT 0.7 09/07/2018 1059    ESTGFRAFRICA >60.0 09/07/2018 1059    EGFRNONAA >60.0 09/07/2018 1059            No results found for: CHOL  No results found for: HDL  No results found for: LDLCALC  No results found for: TRIG  No results found for: CHOLHDL    No results found for: TSH    No results found for: HGBA1C    Assessment:     1. Non-rheumatic mitral regurgitation    2. Essential hypertension    3. Rheumatoid arthritis involving multiple sites with positive rheumatoid factor    4.  Sjogren's syndrome, with unspecified organ involvement    5. Methotrexate, long term, current use    6. Long-term use of Plaquenil        Plan:     Reassurance.    Continue current medicines.    Diet/exercise goals reinforced.    F/U 18 months

## 2018-10-14 ENCOUNTER — PATIENT MESSAGE (OUTPATIENT)
Dept: OPTOMETRY | Facility: CLINIC | Age: 73
End: 2018-10-14

## 2018-10-14 DIAGNOSIS — M05.79 RHEUMATOID ARTHRITIS INVOLVING MULTIPLE SITES WITH POSITIVE RHEUMATOID FACTOR: ICD-10-CM

## 2018-10-15 ENCOUNTER — TELEPHONE (OUTPATIENT)
Dept: OPTOMETRY | Facility: CLINIC | Age: 73
End: 2018-10-15

## 2018-10-15 RX ORDER — METHOTREXATE 2.5 MG/1
TABLET ORAL
Qty: 52 TABLET | Refills: 0 | Status: SHIPPED | OUTPATIENT
Start: 2018-10-15 | End: 2019-01-12 | Stop reason: SDUPTHER

## 2018-10-15 NOTE — TELEPHONE ENCOUNTER
"Spoke to patient an offered appt for new onset of floaters left eye. Pt refused appt he stated "he's not having any trouble with vision, just one floater." He feels he can wait until schedule appt on 11/5/18. I instructed patient to call if increase or change in vision to call and schedule a sooner appt.  "

## 2018-10-16 ENCOUNTER — PATIENT MESSAGE (OUTPATIENT)
Dept: RHEUMATOLOGY | Facility: CLINIC | Age: 73
End: 2018-10-16

## 2018-10-16 DIAGNOSIS — M05.79 RHEUMATOID ARTHRITIS INVOLVING MULTIPLE SITES WITH POSITIVE RHEUMATOID FACTOR: ICD-10-CM

## 2018-10-16 RX ORDER — HYDROXYCHLOROQUINE SULFATE 200 MG/1
200 TABLET, FILM COATED ORAL 2 TIMES DAILY
Qty: 180 TABLET | Refills: 1 | Status: SHIPPED | OUTPATIENT
Start: 2018-10-16 | End: 2019-01-14

## 2018-11-05 ENCOUNTER — OFFICE VISIT (OUTPATIENT)
Dept: OPTOMETRY | Facility: CLINIC | Age: 73
End: 2018-11-05
Payer: MEDICARE

## 2018-11-05 ENCOUNTER — CLINICAL SUPPORT (OUTPATIENT)
Dept: OPHTHALMOLOGY | Facility: CLINIC | Age: 73
End: 2018-11-05
Payer: MEDICARE

## 2018-11-05 DIAGNOSIS — Z79.899 LONG-TERM USE OF PLAQUENIL: ICD-10-CM

## 2018-11-05 DIAGNOSIS — Z79.899 HISTORY OF ONGOING TREATMENT WITH HIGH-RISK MEDICATION: ICD-10-CM

## 2018-11-05 DIAGNOSIS — M06.9 RHEUMATOID ARTHRITIS OF HAND, UNSPECIFIED LATERALITY, UNSPECIFIED RHEUMATOID FACTOR PRESENCE: Primary | ICD-10-CM

## 2018-11-05 DIAGNOSIS — M06.9 RHEUMATOID ARTHRITIS OF HAND, UNSPECIFIED LATERALITY, UNSPECIFIED RHEUMATOID FACTOR PRESENCE: ICD-10-CM

## 2018-11-05 PROCEDURE — 92083 EXTENDED VISUAL FIELD XM: CPT | Mod: 26,S$PBB,, | Performed by: OPTOMETRIST

## 2018-11-05 PROCEDURE — 99499 UNLISTED E&M SERVICE: CPT | Mod: S$PBB,,, | Performed by: OPTOMETRIST

## 2018-11-05 PROCEDURE — 99999 PR PBB SHADOW E&M-EST. PATIENT-LVL III: CPT | Mod: PBBFAC,,, | Performed by: OPTOMETRIST

## 2018-11-05 PROCEDURE — 92083 EXTENDED VISUAL FIELD XM: CPT | Mod: PBBFAC

## 2018-11-05 PROCEDURE — 99213 OFFICE O/P EST LOW 20 MIN: CPT | Mod: PBBFAC,25 | Performed by: OPTOMETRIST

## 2018-11-05 NOTE — PROGRESS NOTES
"HPI     Patient's age: 73 y.o. WM  Occupation: reads to the blind. Former teacher  Approximate date of last eye examination:  9/19/18  Name of last eye doctor seen: Dr Dominguez  City/State: [place]  Wears glasses? yes     If yes, wears  Full-time or part-time?  Full-time  Present glasses are: Bifocal, SV Distance, SV Reading?  bifocals  Approximate age of present glasses:  1 year   Got new glasses following last exam, or subsequently?:  yes   Any problem with VA with glasses?  no  Wears CLs?:  no  Headaches?  no  Eye pain/discomfort?  no                                                                                     Flashes?  no  Floaters?  Yes in [place] occasionally  Diplopia/Double vision?  no  Patient's Ocular History:         Any eye surgeries? no         Any eye injury?  no         Any treatment for eye disease?  No -  takes Plaquenil, and   Methotrexate.  Family history of eye disease?  no  Significant patient medical history:         1. Diabetes?  no       If yes, IDDM or NIDDM? na   2. HBP?  yes              3. Other (describe):  RA   ! OTC eyedrops currently using:  no   ! Prescription eye meds currently using:  Patanol prn   ! Any history of allergy/adverse reaction to any eye meds used   previously?  no    ! Any history of allergy/adverse reaction to eyedrops used during prior   eye exam(s)? no    ! Any history of allergy/adverse reaction to Novacaine or similar meds?   no   ! Any history of allergy/adverse reaction to Epinephrine or similar meds?   no    ! Patient okay with use of anesthetic eyedrops to check eye pressure?    yes        ! Patient okay with use of eyedrops to dilate pupils today?  yes   !  Allergies/Medications/Medical History/Family History reviewed today?    yes      PD =   64/60  Desired reading distance =  19.5"                                                               Last edited by AUDREY Humphreys on 11/5/2018  1:34 PM. (History)            Assessment /Plan     For exam " results, see Encounter Report.    1. Rheumatoid arthritis of hand, unspecified laterality, unspecified rheumatoid factor presence  Wylie Visual Field - Limited - OU - Both Eyes   2. Long-term use of Plaquenil  Wylie Visual Field - Limited - OU - Both Eyes   3. History of ongoing treatment with high-risk medication  Wylie Visual Field - Limited - OU - Both Eyes                  History of Plaquenil use x 5 years.  No evidence of Plaquenil toxicity noted in either eye.  Repeat OCT of retina at macula done on previous visit  No evidence of abnormality of foveal contour on OCT in either eye.  No evidence of pigmentary changes at macula/posterior pole secondary to Plaquenil use in either on previous visit.    In today for repeat Wylie Visual Field (HVF) test (10-2)  Note area of depression inferotemporally in the right eye, but stable.  No apparent change secondary to the use of Plaquenil.  No evidence of abnormality in the left eye.      Discussed with Mr. Sutherland  Cyndi to continue Plaquenil use under supervision of rheumatologist.     Nuclear sclerosis of lens of both eyes, consistent with age.  No need for cataract surgery.    Recheck in one year, with repeat HVF test (10-2) on the same day.  Orders entered for future HVF test.

## 2018-11-05 NOTE — PATIENT INSTRUCTIONS
History of Plaquenil use x 5 years.  No evidence of Plaquenil toxicity noted in either eye.  Repeat OCT of retina at macula done on previous visit  No evidence of abnormality of foveal contour on OCT in either eye.  No evidence of pigmentary changes at macula/posterior pole secondary to Plaquenil use in either on previous visit.    In today for repeat Wylie Visual Field (HVF) test (10-2)  Note area of depression inferotemporally in the right eye, but stable.  No apparent change secondary to the use of Plaquenil.  No evidence of abnormality in the left eye.      Discussed with Mr. Sutherland  Cyndi to continue Plaquenil use under supervision of rheumatologist.     Nuclear sclerosis of lens of both eyes, consistent with age.  No need for cataract surgery.    Recheck in one year, with repeat HVF test (10-2) on the same day.  Orders entered for future HVF test.

## 2018-11-18 DIAGNOSIS — M05.79 RHEUMATOID ARTHRITIS INVOLVING MULTIPLE SITES WITH POSITIVE RHEUMATOID FACTOR: ICD-10-CM

## 2018-11-19 RX ORDER — SULFASALAZINE 500 MG/1
TABLET, DELAYED RELEASE ORAL
Qty: 360 TABLET | Refills: 0 | Status: SHIPPED | OUTPATIENT
Start: 2018-11-19 | End: 2019-02-16 | Stop reason: SDUPTHER

## 2018-12-07 ENCOUNTER — LAB VISIT (OUTPATIENT)
Dept: LAB | Facility: OTHER | Age: 73
End: 2018-12-07
Payer: MEDICARE

## 2018-12-07 DIAGNOSIS — Z79.631 METHOTREXATE, LONG TERM, CURRENT USE: ICD-10-CM

## 2018-12-07 DIAGNOSIS — M05.79 RHEUMATOID ARTHRITIS INVOLVING MULTIPLE SITES WITH POSITIVE RHEUMATOID FACTOR: ICD-10-CM

## 2018-12-07 LAB
ALBUMIN SERPL BCP-MCNC: 3.8 G/DL
ALP SERPL-CCNC: 67 U/L
ALT SERPL W/O P-5'-P-CCNC: 26 U/L
ANION GAP SERPL CALC-SCNC: 10 MMOL/L
AST SERPL-CCNC: 30 U/L
BASOPHILS # BLD AUTO: 0.02 K/UL
BASOPHILS NFR BLD: 0.2 %
BILIRUB SERPL-MCNC: 0.5 MG/DL
BUN SERPL-MCNC: 28 MG/DL
CALCIUM SERPL-MCNC: 9.3 MG/DL
CHLORIDE SERPL-SCNC: 104 MMOL/L
CO2 SERPL-SCNC: 25 MMOL/L
CREAT SERPL-MCNC: 1 MG/DL
CRP SERPL-MCNC: 24 MG/L
DIFFERENTIAL METHOD: ABNORMAL
EOSINOPHIL # BLD AUTO: 0.1 K/UL
EOSINOPHIL NFR BLD: 1.6 %
ERYTHROCYTE [DISTWIDTH] IN BLOOD BY AUTOMATED COUNT: 13.7 %
ERYTHROCYTE [SEDIMENTATION RATE] IN BLOOD: 28 MM/HR
EST. GFR  (AFRICAN AMERICAN): >60 ML/MIN/1.73 M^2
EST. GFR  (NON AFRICAN AMERICAN): >60 ML/MIN/1.73 M^2
GLUCOSE SERPL-MCNC: 115 MG/DL
HCT VFR BLD AUTO: 37.7 %
HGB BLD-MCNC: 12.9 G/DL
LYMPHOCYTES # BLD AUTO: 0.7 K/UL
LYMPHOCYTES NFR BLD: 8.7 %
MCH RBC QN AUTO: 33.3 PG
MCHC RBC AUTO-ENTMCNC: 34.2 G/DL
MCV RBC AUTO: 97 FL
MONOCYTES # BLD AUTO: 0.7 K/UL
MONOCYTES NFR BLD: 8.1 %
NEUTROPHILS # BLD AUTO: 6.6 K/UL
NEUTROPHILS NFR BLD: 81 %
PLATELET # BLD AUTO: 141 K/UL
PMV BLD AUTO: 10.1 FL
POTASSIUM SERPL-SCNC: 3.8 MMOL/L
PROT SERPL-MCNC: 7.1 G/DL
RBC # BLD AUTO: 3.87 M/UL
SODIUM SERPL-SCNC: 139 MMOL/L
WBC # BLD AUTO: 8.15 K/UL

## 2018-12-07 PROCEDURE — 85651 RBC SED RATE NONAUTOMATED: CPT

## 2018-12-07 PROCEDURE — 80053 COMPREHEN METABOLIC PANEL: CPT

## 2018-12-07 PROCEDURE — 36415 COLL VENOUS BLD VENIPUNCTURE: CPT

## 2018-12-07 PROCEDURE — 85025 COMPLETE CBC W/AUTO DIFF WBC: CPT

## 2018-12-07 PROCEDURE — 86140 C-REACTIVE PROTEIN: CPT

## 2018-12-09 ENCOUNTER — PATIENT MESSAGE (OUTPATIENT)
Dept: RHEUMATOLOGY | Facility: CLINIC | Age: 73
End: 2018-12-09

## 2018-12-09 DIAGNOSIS — J01.90 ACUTE BACTERIAL RHINOSINUSITIS: Primary | ICD-10-CM

## 2018-12-09 DIAGNOSIS — B96.89 ACUTE BACTERIAL RHINOSINUSITIS: Primary | ICD-10-CM

## 2018-12-10 RX ORDER — AZITHROMYCIN 250 MG/1
TABLET, FILM COATED ORAL
Qty: 6 TABLET | Refills: 2 | Status: SHIPPED | OUTPATIENT
Start: 2018-12-10 | End: 2018-12-15

## 2018-12-11 DIAGNOSIS — M35.01 SJOGREN'S SYNDROME WITH KERATOCONJUNCTIVITIS SICCA: ICD-10-CM

## 2018-12-11 RX ORDER — CEVIMELINE HYDROCHLORIDE 30 MG/1
30 CAPSULE ORAL 3 TIMES DAILY
Qty: 180 CAPSULE | Refills: 3 | Status: SHIPPED | OUTPATIENT
Start: 2018-12-11 | End: 2019-08-05 | Stop reason: SDUPTHER

## 2019-01-12 DIAGNOSIS — M05.79 RHEUMATOID ARTHRITIS INVOLVING MULTIPLE SITES WITH POSITIVE RHEUMATOID FACTOR: ICD-10-CM

## 2019-01-14 RX ORDER — METHOTREXATE 2.5 MG/1
TABLET ORAL
Qty: 52 TABLET | Refills: 0 | Status: SHIPPED | OUTPATIENT
Start: 2019-01-14 | End: 2019-04-13 | Stop reason: SDUPTHER

## 2019-02-16 DIAGNOSIS — M05.79 RHEUMATOID ARTHRITIS INVOLVING MULTIPLE SITES WITH POSITIVE RHEUMATOID FACTOR: ICD-10-CM

## 2019-02-18 RX ORDER — SULFASALAZINE 500 MG/1
TABLET, DELAYED RELEASE ORAL
Qty: 360 TABLET | Refills: 0 | Status: SHIPPED | OUTPATIENT
Start: 2019-02-18 | End: 2019-05-18 | Stop reason: SDUPTHER

## 2019-02-26 ENCOUNTER — PATIENT MESSAGE (OUTPATIENT)
Dept: RHEUMATOLOGY | Facility: CLINIC | Age: 74
End: 2019-02-26

## 2019-02-26 DIAGNOSIS — M05.79 RHEUMATOID ARTHRITIS INVOLVING MULTIPLE SITES WITH POSITIVE RHEUMATOID FACTOR: Primary | Chronic | ICD-10-CM

## 2019-02-26 RX ORDER — HYDROXYCHLOROQUINE SULFATE 200 MG/1
200 TABLET, FILM COATED ORAL 2 TIMES DAILY
Refills: 5 | COMMUNITY
Start: 2019-02-12 | End: 2019-02-26 | Stop reason: SDUPTHER

## 2019-02-26 RX ORDER — HYDROXYCHLOROQUINE SULFATE 200 MG/1
200 TABLET, FILM COATED ORAL 2 TIMES DAILY
Qty: 60 TABLET | Refills: 5 | Status: SHIPPED | OUTPATIENT
Start: 2019-02-26 | End: 2019-02-27 | Stop reason: SDUPTHER

## 2019-02-27 RX ORDER — HYDROXYCHLOROQUINE SULFATE 200 MG/1
200 TABLET, FILM COATED ORAL 2 TIMES DAILY
Qty: 60 TABLET | Refills: 5 | Status: SHIPPED | OUTPATIENT
Start: 2019-02-27 | End: 2019-11-14 | Stop reason: SDUPTHER

## 2019-03-07 ENCOUNTER — LAB VISIT (OUTPATIENT)
Dept: LAB | Facility: OTHER | Age: 74
End: 2019-03-07
Payer: MEDICARE

## 2019-03-07 DIAGNOSIS — Z79.631 METHOTREXATE, LONG TERM, CURRENT USE: ICD-10-CM

## 2019-03-07 DIAGNOSIS — M05.79 RHEUMATOID ARTHRITIS INVOLVING MULTIPLE SITES WITH POSITIVE RHEUMATOID FACTOR: ICD-10-CM

## 2019-03-07 LAB
ALBUMIN SERPL BCP-MCNC: 3.8 G/DL
ALP SERPL-CCNC: 71 U/L
ALT SERPL W/O P-5'-P-CCNC: 30 U/L
ANION GAP SERPL CALC-SCNC: 7 MMOL/L
AST SERPL-CCNC: 38 U/L
BASOPHILS # BLD AUTO: 0.01 K/UL
BASOPHILS NFR BLD: 0.3 %
BILIRUB SERPL-MCNC: 0.4 MG/DL
BUN SERPL-MCNC: 30 MG/DL
CALCIUM SERPL-MCNC: 8.8 MG/DL
CHLORIDE SERPL-SCNC: 108 MMOL/L
CO2 SERPL-SCNC: 27 MMOL/L
CREAT SERPL-MCNC: 0.9 MG/DL
CRP SERPL-MCNC: 2.5 MG/L
DIFFERENTIAL METHOD: ABNORMAL
EOSINOPHIL # BLD AUTO: 0.2 K/UL
EOSINOPHIL NFR BLD: 4.5 %
ERYTHROCYTE [DISTWIDTH] IN BLOOD BY AUTOMATED COUNT: 14.2 %
ERYTHROCYTE [SEDIMENTATION RATE] IN BLOOD: 5 MM/HR
EST. GFR  (AFRICAN AMERICAN): >60 ML/MIN/1.73 M^2
EST. GFR  (NON AFRICAN AMERICAN): >60 ML/MIN/1.73 M^2
GLUCOSE SERPL-MCNC: 82 MG/DL
HCT VFR BLD AUTO: 38.1 %
HGB BLD-MCNC: 12.6 G/DL
LYMPHOCYTES # BLD AUTO: 0.7 K/UL
LYMPHOCYTES NFR BLD: 18.4 %
MCH RBC QN AUTO: 32.5 PG
MCHC RBC AUTO-ENTMCNC: 33.1 G/DL
MCV RBC AUTO: 98 FL
MONOCYTES # BLD AUTO: 0.4 K/UL
MONOCYTES NFR BLD: 11.5 %
NEUTROPHILS # BLD AUTO: 2.4 K/UL
NEUTROPHILS NFR BLD: 64.8 %
PLATELET # BLD AUTO: 147 K/UL
PMV BLD AUTO: 10.6 FL
POTASSIUM SERPL-SCNC: 4.8 MMOL/L
PROT SERPL-MCNC: 6.7 G/DL
RBC # BLD AUTO: 3.88 M/UL
SODIUM SERPL-SCNC: 142 MMOL/L
WBC # BLD AUTO: 3.75 K/UL

## 2019-03-07 PROCEDURE — 80053 COMPREHEN METABOLIC PANEL: CPT

## 2019-03-07 PROCEDURE — 85025 COMPLETE CBC W/AUTO DIFF WBC: CPT

## 2019-03-07 PROCEDURE — 36415 COLL VENOUS BLD VENIPUNCTURE: CPT

## 2019-03-07 PROCEDURE — 86140 C-REACTIVE PROTEIN: CPT

## 2019-03-07 PROCEDURE — 85651 RBC SED RATE NONAUTOMATED: CPT

## 2019-04-10 ENCOUNTER — LAB VISIT (OUTPATIENT)
Dept: LAB | Facility: OTHER | Age: 74
End: 2019-04-10
Payer: MEDICARE

## 2019-04-10 DIAGNOSIS — Z79.631 METHOTREXATE, LONG TERM, CURRENT USE: ICD-10-CM

## 2019-04-10 LAB
BASOPHILS # BLD AUTO: 0.01 K/UL (ref 0–0.2)
BASOPHILS NFR BLD: 0.2 % (ref 0–1.9)
DIFFERENTIAL METHOD: ABNORMAL
EOSINOPHIL # BLD AUTO: 0.1 K/UL (ref 0–0.5)
EOSINOPHIL NFR BLD: 1.5 % (ref 0–8)
ERYTHROCYTE [DISTWIDTH] IN BLOOD BY AUTOMATED COUNT: 13.5 % (ref 11.5–14.5)
HCT VFR BLD AUTO: 40.9 % (ref 40–54)
HGB BLD-MCNC: 14.1 G/DL (ref 14–18)
LYMPHOCYTES # BLD AUTO: 0.7 K/UL (ref 1–4.8)
LYMPHOCYTES NFR BLD: 12.5 % (ref 18–48)
MCH RBC QN AUTO: 33.1 PG (ref 27–31)
MCHC RBC AUTO-ENTMCNC: 34.5 G/DL (ref 32–36)
MCV RBC AUTO: 96 FL (ref 82–98)
MONOCYTES # BLD AUTO: 0.6 K/UL (ref 0.3–1)
MONOCYTES NFR BLD: 10.3 % (ref 4–15)
NEUTROPHILS # BLD AUTO: 4.4 K/UL (ref 1.8–7.7)
NEUTROPHILS NFR BLD: 75.2 % (ref 38–73)
PLATELET # BLD AUTO: 181 K/UL (ref 150–350)
PMV BLD AUTO: 10.8 FL (ref 9.2–12.9)
RBC # BLD AUTO: 4.26 M/UL (ref 4.6–6.2)
WBC # BLD AUTO: 5.83 K/UL (ref 3.9–12.7)

## 2019-04-10 PROCEDURE — 36415 COLL VENOUS BLD VENIPUNCTURE: CPT

## 2019-04-10 PROCEDURE — 85025 COMPLETE CBC W/AUTO DIFF WBC: CPT

## 2019-04-13 DIAGNOSIS — M05.79 RHEUMATOID ARTHRITIS INVOLVING MULTIPLE SITES WITH POSITIVE RHEUMATOID FACTOR: ICD-10-CM

## 2019-04-13 RX ORDER — METHOTREXATE 2.5 MG/1
TABLET ORAL
Qty: 52 TABLET | Refills: 0 | Status: SHIPPED | OUTPATIENT
Start: 2019-04-13 | End: 2019-07-12 | Stop reason: SDUPTHER

## 2019-05-18 DIAGNOSIS — M05.79 RHEUMATOID ARTHRITIS INVOLVING MULTIPLE SITES WITH POSITIVE RHEUMATOID FACTOR: ICD-10-CM

## 2019-05-20 RX ORDER — SULFASALAZINE 500 MG/1
TABLET, DELAYED RELEASE ORAL
Qty: 360 TABLET | Refills: 0 | Status: SHIPPED | OUTPATIENT
Start: 2019-05-20 | End: 2019-08-17 | Stop reason: SDUPTHER

## 2019-06-10 ENCOUNTER — LAB VISIT (OUTPATIENT)
Dept: LAB | Facility: HOSPITAL | Age: 74
End: 2019-06-10
Attending: INTERNAL MEDICINE
Payer: MEDICARE

## 2019-06-10 ENCOUNTER — OFFICE VISIT (OUTPATIENT)
Dept: RHEUMATOLOGY | Facility: CLINIC | Age: 74
End: 2019-06-10
Payer: MEDICARE

## 2019-06-10 VITALS
DIASTOLIC BLOOD PRESSURE: 89 MMHG | BODY MASS INDEX: 25.12 KG/M2 | SYSTOLIC BLOOD PRESSURE: 141 MMHG | HEIGHT: 71 IN | HEART RATE: 78 BPM | WEIGHT: 179.44 LBS

## 2019-06-10 DIAGNOSIS — Z79.631 METHOTREXATE, LONG TERM, CURRENT USE: ICD-10-CM

## 2019-06-10 DIAGNOSIS — M05.79 RHEUMATOID ARTHRITIS INVOLVING MULTIPLE SITES WITH POSITIVE RHEUMATOID FACTOR: Primary | Chronic | ICD-10-CM

## 2019-06-10 DIAGNOSIS — M35.00 SJOGREN'S SYNDROME, WITH UNSPECIFIED ORGAN INVOLVEMENT: Chronic | ICD-10-CM

## 2019-06-10 DIAGNOSIS — M05.79 RHEUMATOID ARTHRITIS INVOLVING MULTIPLE SITES WITH POSITIVE RHEUMATOID FACTOR: Chronic | ICD-10-CM

## 2019-06-10 DIAGNOSIS — Z79.899 LONG-TERM USE OF PLAQUENIL: ICD-10-CM

## 2019-06-10 LAB
ALBUMIN SERPL BCP-MCNC: 4 G/DL (ref 3.5–5.2)
ALP SERPL-CCNC: 75 U/L (ref 55–135)
ALT SERPL W/O P-5'-P-CCNC: 19 U/L (ref 10–44)
ANION GAP SERPL CALC-SCNC: 9 MMOL/L (ref 8–16)
AST SERPL-CCNC: 29 U/L (ref 10–40)
BASOPHILS # BLD AUTO: 0.03 K/UL (ref 0–0.2)
BASOPHILS NFR BLD: 0.6 % (ref 0–1.9)
BILIRUB SERPL-MCNC: 0.5 MG/DL (ref 0.1–1)
BUN SERPL-MCNC: 22 MG/DL (ref 8–23)
CALCIUM SERPL-MCNC: 9.8 MG/DL (ref 8.7–10.5)
CHLORIDE SERPL-SCNC: 104 MMOL/L (ref 95–110)
CO2 SERPL-SCNC: 26 MMOL/L (ref 23–29)
CREAT SERPL-MCNC: 1.1 MG/DL (ref 0.5–1.4)
CRP SERPL-MCNC: 3.2 MG/L (ref 0–8.2)
DIFFERENTIAL METHOD: ABNORMAL
EOSINOPHIL # BLD AUTO: 0.2 K/UL (ref 0–0.5)
EOSINOPHIL NFR BLD: 3.7 % (ref 0–8)
ERYTHROCYTE [DISTWIDTH] IN BLOOD BY AUTOMATED COUNT: 13.4 % (ref 11.5–14.5)
ERYTHROCYTE [SEDIMENTATION RATE] IN BLOOD BY WESTERGREN METHOD: 3 MM/HR (ref 0–23)
EST. GFR  (AFRICAN AMERICAN): >60 ML/MIN/1.73 M^2
EST. GFR  (NON AFRICAN AMERICAN): >60 ML/MIN/1.73 M^2
GLUCOSE SERPL-MCNC: 100 MG/DL (ref 70–110)
HCT VFR BLD AUTO: 41.2 % (ref 40–54)
HGB BLD-MCNC: 13.6 G/DL (ref 14–18)
IMM GRANULOCYTES # BLD AUTO: 0.03 K/UL (ref 0–0.04)
IMM GRANULOCYTES NFR BLD AUTO: 0.6 % (ref 0–0.5)
LYMPHOCYTES # BLD AUTO: 0.9 K/UL (ref 1–4.8)
LYMPHOCYTES NFR BLD: 18.3 % (ref 18–48)
MCH RBC QN AUTO: 33.3 PG (ref 27–31)
MCHC RBC AUTO-ENTMCNC: 33 G/DL (ref 32–36)
MCV RBC AUTO: 101 FL (ref 82–98)
MONOCYTES # BLD AUTO: 0.6 K/UL (ref 0.3–1)
MONOCYTES NFR BLD: 11.5 % (ref 4–15)
NEUTROPHILS # BLD AUTO: 3.4 K/UL (ref 1.8–7.7)
NEUTROPHILS NFR BLD: 65.3 % (ref 38–73)
NRBC BLD-RTO: 0 /100 WBC
PLATELET # BLD AUTO: 157 K/UL (ref 150–350)
PMV BLD AUTO: 9.7 FL (ref 9.2–12.9)
POTASSIUM SERPL-SCNC: 4.6 MMOL/L (ref 3.5–5.1)
PROT SERPL-MCNC: 7.3 G/DL (ref 6–8.4)
RBC # BLD AUTO: 4.09 M/UL (ref 4.6–6.2)
SODIUM SERPL-SCNC: 139 MMOL/L (ref 136–145)
WBC # BLD AUTO: 5.13 K/UL (ref 3.9–12.7)

## 2019-06-10 PROCEDURE — 99213 OFFICE O/P EST LOW 20 MIN: CPT | Mod: PBBFAC | Performed by: INTERNAL MEDICINE

## 2019-06-10 PROCEDURE — 86140 C-REACTIVE PROTEIN: CPT

## 2019-06-10 PROCEDURE — 80053 COMPREHEN METABOLIC PANEL: CPT

## 2019-06-10 PROCEDURE — 99999 PR PBB SHADOW E&M-EST. PATIENT-LVL III: CPT | Mod: PBBFAC,,, | Performed by: INTERNAL MEDICINE

## 2019-06-10 PROCEDURE — 99214 OFFICE O/P EST MOD 30 MIN: CPT | Mod: S$PBB,,, | Performed by: INTERNAL MEDICINE

## 2019-06-10 PROCEDURE — 99999 PR PBB SHADOW E&M-EST. PATIENT-LVL III: ICD-10-PCS | Mod: PBBFAC,,, | Performed by: INTERNAL MEDICINE

## 2019-06-10 PROCEDURE — 99214 PR OFFICE/OUTPT VISIT, EST, LEVL IV, 30-39 MIN: ICD-10-PCS | Mod: S$PBB,,, | Performed by: INTERNAL MEDICINE

## 2019-06-10 PROCEDURE — 36415 COLL VENOUS BLD VENIPUNCTURE: CPT

## 2019-06-10 PROCEDURE — 85652 RBC SED RATE AUTOMATED: CPT

## 2019-06-10 PROCEDURE — 85025 COMPLETE CBC W/AUTO DIFF WBC: CPT

## 2019-06-10 RX ORDER — OMEPRAZOLE 20 MG/1
40 TABLET, ORALLY DISINTEGRATING, DELAYED RELEASE ORAL 2 TIMES DAILY
COMMUNITY
End: 2019-12-16

## 2019-06-10 ASSESSMENT — ROUTINE ASSESSMENT OF PATIENT INDEX DATA (RAPID3)
FATIGUE SCORE: 2
PAIN SCORE: .5
PATIENT GLOBAL ASSESSMENT SCORE: .5
TOTAL RAPID3 SCORE: .44
MDHAQ FUNCTION SCORE: .1
WHEN YOU AWAKENED IN THE MORNING OVER THE LAST WEEK, PLEASE INDICATE THE AMOUNT OF TIME IT TAKES UNTIL YOU ARE AS LIMBER AS YOU WILL BE FOR THE DAY: 15 MINUTES
PSYCHOLOGICAL DISTRESS SCORE: 1.1
AM STIFFNESS SCORE: 1, YES

## 2019-06-10 NOTE — PROGRESS NOTES
"Subjective:       Patient ID: Tom Sutherland is a 74 y.o. male.    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   HENT: Negative for trouble swallowing.    Eyes: Negative for redness.   Respiratory: Negative for cough and shortness of breath.    Cardiovascular: Negative for chest pain.   Gastrointestinal: Negative for constipation and diarrhea.   Genitourinary: Negative for genital sores.   Skin: Negative for rash.   Neurological: Negative for headaches.   Hematological: Does not bruise/bleed easily.         Objective:   BP (!) 141/89   Pulse 78   Ht 5' 11" (1.803 m)   Wt 81.4 kg (179 lb 7.3 oz)   BMI 25.03 kg/m²      Physical Exam   Constitutional: He is well-developed, well-nourished, and in no distress.   HENT:   Mouth/Throat: No oropharyngeal exudate.   Eyes: No scleral icterus.   Cardiovascular: Normal rate and regular rhythm.    Pulmonary/Chest: He has no wheezes. He has no rales.   Abdominal: There is no tenderness.   Lymphadenopathy:     He has no cervical adenopathy.   Skin: No rash noted.          Physical Exam     Swelling:   Left hand: 4th PIP    LOVE-28 tender joint count: 0  LOVE-28 swollen joint count: 1  Patient global assessment: 1       Lab Results   Component Value Date    SEDRATE 5 03/07/2019     Lab Results   Component Value Date    WBC 5.83 04/10/2019    HGB 14.1 04/10/2019    HCT 40.9 04/10/2019    MCV 96 04/10/2019     04/10/2019       Assessment:       1. Rheumatoid arthritis involving multiple sites with positive rheumatoid factor    2. Methotrexate, long term, current use    3. Sjogren's syndrome, with unspecified " organ involvement    4. Long-term use of Plaquenil        Plan:       Problem List Items Addressed This Visit        Active Problems    Rheumatoid arthritis involving multiple sites with positive rheumatoid factor - Primary (Chronic)     Doing well on triple therapy with no interval changes  LOVE remission    Needs lab today and in 3 and 6 m but can then f/u 6 mo if no interval issues         Relevant Orders    CBC auto differential    C-reactive protein    Sedimentation rate    Methotrexate, long term, current use     Has had normal CBC since lowering mtx to 10 mg/week  Will cont to check CBC q3m         Relevant Orders    Comprehensive metabolic panel    CBC auto differential    Sjogren's syndrome (Chronic)     Not currently symptomatic         Long-term use of Plaquenil     Saw Dr Dominguez November 2018

## 2019-06-10 NOTE — ASSESSMENT & PLAN NOTE
Doing well on triple therapy with no interval changes  LOVE remission    Needs lab today and in 3 and 6 m but can then f/u 6 mo if no interval issues

## 2019-06-11 ENCOUNTER — PATIENT MESSAGE (OUTPATIENT)
Dept: RHEUMATOLOGY | Facility: CLINIC | Age: 74
End: 2019-06-11

## 2019-07-12 DIAGNOSIS — M05.79 RHEUMATOID ARTHRITIS INVOLVING MULTIPLE SITES WITH POSITIVE RHEUMATOID FACTOR: ICD-10-CM

## 2019-07-12 RX ORDER — METHOTREXATE 2.5 MG/1
TABLET ORAL
Qty: 52 TABLET | Refills: 0 | Status: SHIPPED | OUTPATIENT
Start: 2019-07-12 | End: 2019-12-03 | Stop reason: SDUPTHER

## 2019-08-05 DIAGNOSIS — M35.01 SJOGREN'S SYNDROME WITH KERATOCONJUNCTIVITIS SICCA: ICD-10-CM

## 2019-08-05 RX ORDER — CEVIMELINE HYDROCHLORIDE 30 MG/1
30 CAPSULE ORAL 3 TIMES DAILY
Qty: 180 CAPSULE | Refills: 3 | Status: SHIPPED | OUTPATIENT
Start: 2019-08-05 | End: 2020-06-10 | Stop reason: SDUPTHER

## 2019-08-17 DIAGNOSIS — M05.79 RHEUMATOID ARTHRITIS INVOLVING MULTIPLE SITES WITH POSITIVE RHEUMATOID FACTOR: ICD-10-CM

## 2019-08-19 RX ORDER — SULFASALAZINE 500 MG/1
TABLET, DELAYED RELEASE ORAL
Qty: 360 TABLET | Refills: 1 | Status: SHIPPED | OUTPATIENT
Start: 2019-08-19 | End: 2020-02-14

## 2019-09-03 RX ORDER — FOLIC ACID 1 MG/1
TABLET ORAL
Qty: 180 TABLET | Refills: 4 | Status: SHIPPED | OUTPATIENT
Start: 2019-09-03 | End: 2020-11-25

## 2019-10-06 ENCOUNTER — PATIENT MESSAGE (OUTPATIENT)
Dept: RHEUMATOLOGY | Facility: CLINIC | Age: 74
End: 2019-10-06

## 2019-10-06 DIAGNOSIS — J20.9 ACUTE BRONCHITIS, UNSPECIFIED ORGANISM: ICD-10-CM

## 2019-10-07 ENCOUNTER — PATIENT MESSAGE (OUTPATIENT)
Dept: RHEUMATOLOGY | Facility: CLINIC | Age: 74
End: 2019-10-07

## 2019-10-07 DIAGNOSIS — J20.9 ACUTE BRONCHITIS, UNSPECIFIED ORGANISM: ICD-10-CM

## 2019-10-07 RX ORDER — AZITHROMYCIN 250 MG/1
TABLET, FILM COATED ORAL
Qty: 12 TABLET | Refills: 2 | Status: SHIPPED | OUTPATIENT
Start: 2019-10-07 | End: 2019-10-12

## 2019-10-07 RX ORDER — AZITHROMYCIN 250 MG/1
TABLET, FILM COATED ORAL
Qty: 6 TABLET | Refills: 0 | Status: SHIPPED | OUTPATIENT
Start: 2019-10-07 | End: 2019-10-07

## 2019-10-10 ENCOUNTER — PATIENT MESSAGE (OUTPATIENT)
Dept: RHEUMATOLOGY | Facility: CLINIC | Age: 74
End: 2019-10-10

## 2019-10-10 DIAGNOSIS — M05.79 RHEUMATOID ARTHRITIS INVOLVING MULTIPLE SITES WITH POSITIVE RHEUMATOID FACTOR: ICD-10-CM

## 2019-10-10 RX ORDER — METHOTREXATE 2.5 MG/1
TABLET ORAL
Qty: 52 TABLET | Refills: 4 | OUTPATIENT
Start: 2019-10-10

## 2019-10-15 ENCOUNTER — LAB VISIT (OUTPATIENT)
Dept: LAB | Facility: OTHER | Age: 74
End: 2019-10-15
Attending: INTERNAL MEDICINE
Payer: MEDICARE

## 2019-10-15 DIAGNOSIS — Z79.631 METHOTREXATE, LONG TERM, CURRENT USE: ICD-10-CM

## 2019-10-15 DIAGNOSIS — M05.79 RHEUMATOID ARTHRITIS INVOLVING MULTIPLE SITES WITH POSITIVE RHEUMATOID FACTOR: Chronic | ICD-10-CM

## 2019-10-15 LAB
ALBUMIN SERPL BCP-MCNC: 3.7 G/DL (ref 3.5–5.2)
ALP SERPL-CCNC: 70 U/L (ref 55–135)
ALT SERPL W/O P-5'-P-CCNC: 20 U/L (ref 10–44)
ANION GAP SERPL CALC-SCNC: 9 MMOL/L (ref 8–16)
AST SERPL-CCNC: 22 U/L (ref 10–40)
BASOPHILS # BLD AUTO: 0.03 K/UL (ref 0–0.2)
BASOPHILS NFR BLD: 0.4 % (ref 0–1.9)
BILIRUB SERPL-MCNC: 0.6 MG/DL (ref 0.1–1)
BUN SERPL-MCNC: 19 MG/DL (ref 8–23)
CALCIUM SERPL-MCNC: 9.4 MG/DL (ref 8.7–10.5)
CHLORIDE SERPL-SCNC: 102 MMOL/L (ref 95–110)
CO2 SERPL-SCNC: 27 MMOL/L (ref 23–29)
CREAT SERPL-MCNC: 1 MG/DL (ref 0.5–1.4)
CRP SERPL-MCNC: 17.7 MG/L (ref 0–8.2)
DIFFERENTIAL METHOD: ABNORMAL
EOSINOPHIL # BLD AUTO: 0.1 K/UL (ref 0–0.5)
EOSINOPHIL NFR BLD: 1.3 % (ref 0–8)
ERYTHROCYTE [DISTWIDTH] IN BLOOD BY AUTOMATED COUNT: 13.6 % (ref 11.5–14.5)
ERYTHROCYTE [SEDIMENTATION RATE] IN BLOOD: 29 MM/HR (ref 0–10)
EST. GFR  (AFRICAN AMERICAN): >60 ML/MIN/1.73 M^2
EST. GFR  (NON AFRICAN AMERICAN): >60 ML/MIN/1.73 M^2
GLUCOSE SERPL-MCNC: 105 MG/DL (ref 70–110)
HCT VFR BLD AUTO: 37.8 % (ref 40–54)
HGB BLD-MCNC: 13 G/DL (ref 14–18)
IMM GRANULOCYTES # BLD AUTO: 0.05 K/UL (ref 0–0.04)
IMM GRANULOCYTES NFR BLD AUTO: 0.6 % (ref 0–0.5)
LYMPHOCYTES # BLD AUTO: 0.8 K/UL (ref 1–4.8)
LYMPHOCYTES NFR BLD: 9.9 % (ref 18–48)
MCH RBC QN AUTO: 32.3 PG (ref 27–31)
MCHC RBC AUTO-ENTMCNC: 34.4 G/DL (ref 32–36)
MCV RBC AUTO: 94 FL (ref 82–98)
MONOCYTES # BLD AUTO: 0.9 K/UL (ref 0.3–1)
MONOCYTES NFR BLD: 11.1 % (ref 4–15)
NEUTROPHILS # BLD AUTO: 6.1 K/UL (ref 1.8–7.7)
NEUTROPHILS NFR BLD: 76.7 % (ref 38–73)
NRBC BLD-RTO: 0 /100 WBC
PLATELET # BLD AUTO: 178 K/UL (ref 150–350)
PMV BLD AUTO: 10 FL (ref 9.2–12.9)
POTASSIUM SERPL-SCNC: 4.4 MMOL/L (ref 3.5–5.1)
PROT SERPL-MCNC: 7.1 G/DL (ref 6–8.4)
RBC # BLD AUTO: 4.02 M/UL (ref 4.6–6.2)
SODIUM SERPL-SCNC: 138 MMOL/L (ref 136–145)
WBC # BLD AUTO: 7.96 K/UL (ref 3.9–12.7)

## 2019-10-15 PROCEDURE — 86140 C-REACTIVE PROTEIN: CPT

## 2019-10-15 PROCEDURE — 85651 RBC SED RATE NONAUTOMATED: CPT

## 2019-10-15 PROCEDURE — 36415 COLL VENOUS BLD VENIPUNCTURE: CPT

## 2019-10-15 PROCEDURE — 80053 COMPREHEN METABOLIC PANEL: CPT

## 2019-10-15 PROCEDURE — 85025 COMPLETE CBC W/AUTO DIFF WBC: CPT

## 2019-11-14 DIAGNOSIS — M05.79 RHEUMATOID ARTHRITIS INVOLVING MULTIPLE SITES WITH POSITIVE RHEUMATOID FACTOR: Chronic | ICD-10-CM

## 2019-11-14 RX ORDER — HYDROXYCHLOROQUINE SULFATE 200 MG/1
200 TABLET, FILM COATED ORAL 2 TIMES DAILY
Qty: 180 TABLET | Refills: 0 | Status: SHIPPED | OUTPATIENT
Start: 2019-11-14 | End: 2020-01-27

## 2019-12-02 ENCOUNTER — PATIENT MESSAGE (OUTPATIENT)
Dept: RHEUMATOLOGY | Facility: CLINIC | Age: 74
End: 2019-12-02

## 2019-12-03 DIAGNOSIS — M05.79 RHEUMATOID ARTHRITIS INVOLVING MULTIPLE SITES WITH POSITIVE RHEUMATOID FACTOR: ICD-10-CM

## 2019-12-03 RX ORDER — METHOTREXATE 2.5 MG/1
TABLET ORAL
Qty: 52 TABLET | Refills: 0 | Status: SHIPPED | OUTPATIENT
Start: 2019-12-03 | End: 2019-12-03

## 2019-12-03 RX ORDER — METHOTREXATE 2.5 MG/1
TABLET ORAL
Qty: 52 TABLET | Refills: 0 | Status: CANCELLED | OUTPATIENT
Start: 2019-12-03

## 2019-12-03 RX ORDER — METHOTREXATE 2.5 MG/1
TABLET ORAL
Qty: 52 TABLET | Refills: 0 | Status: SHIPPED | OUTPATIENT
Start: 2019-12-03 | End: 2020-03-02

## 2019-12-03 RX ORDER — METHOTREXATE 2.5 MG/1
10 TABLET ORAL
Qty: 12 TABLET | Refills: 0 | Status: SHIPPED | OUTPATIENT
Start: 2019-12-03 | End: 2019-12-18

## 2019-12-16 ENCOUNTER — OFFICE VISIT (OUTPATIENT)
Dept: RHEUMATOLOGY | Facility: CLINIC | Age: 74
End: 2019-12-16
Payer: MEDICARE

## 2019-12-16 VITALS
WEIGHT: 187.19 LBS | DIASTOLIC BLOOD PRESSURE: 86 MMHG | SYSTOLIC BLOOD PRESSURE: 145 MMHG | BODY MASS INDEX: 26.21 KG/M2 | HEART RATE: 75 BPM | HEIGHT: 71 IN

## 2019-12-16 DIAGNOSIS — M35.00 SJOGREN'S SYNDROME WITHOUT EXTRAGLANDULAR INVOLVEMENT: Chronic | ICD-10-CM

## 2019-12-16 DIAGNOSIS — Z79.631 METHOTREXATE, LONG TERM, CURRENT USE: ICD-10-CM

## 2019-12-16 DIAGNOSIS — Z79.899 LONG-TERM USE OF PLAQUENIL: ICD-10-CM

## 2019-12-16 DIAGNOSIS — M05.79 RHEUMATOID ARTHRITIS INVOLVING MULTIPLE SITES WITH POSITIVE RHEUMATOID FACTOR: Chronic | ICD-10-CM

## 2019-12-16 PROCEDURE — 1159F PR MEDICATION LIST DOCUMENTED IN MEDICAL RECORD: ICD-10-PCS | Mod: ,,, | Performed by: INTERNAL MEDICINE

## 2019-12-16 PROCEDURE — 1125F PR PAIN SEVERITY QUANTIFIED, PAIN PRESENT: ICD-10-PCS | Mod: ,,, | Performed by: INTERNAL MEDICINE

## 2019-12-16 PROCEDURE — 99999 PR PBB SHADOW E&M-EST. PATIENT-LVL III: CPT | Mod: PBBFAC,,, | Performed by: INTERNAL MEDICINE

## 2019-12-16 PROCEDURE — 99999 PR PBB SHADOW E&M-EST. PATIENT-LVL III: ICD-10-PCS | Mod: PBBFAC,,, | Performed by: INTERNAL MEDICINE

## 2019-12-16 PROCEDURE — 1125F AMNT PAIN NOTED PAIN PRSNT: CPT | Mod: ,,, | Performed by: INTERNAL MEDICINE

## 2019-12-16 PROCEDURE — 99214 PR OFFICE/OUTPT VISIT, EST, LEVL IV, 30-39 MIN: ICD-10-PCS | Mod: S$PBB,,, | Performed by: INTERNAL MEDICINE

## 2019-12-16 PROCEDURE — 99214 OFFICE O/P EST MOD 30 MIN: CPT | Mod: S$PBB,,, | Performed by: INTERNAL MEDICINE

## 2019-12-16 PROCEDURE — 99213 OFFICE O/P EST LOW 20 MIN: CPT | Mod: PBBFAC | Performed by: INTERNAL MEDICINE

## 2019-12-16 PROCEDURE — 1159F MED LIST DOCD IN RCRD: CPT | Mod: ,,, | Performed by: INTERNAL MEDICINE

## 2019-12-16 ASSESSMENT — ROUTINE ASSESSMENT OF PATIENT INDEX DATA (RAPID3)
MDHAQ FUNCTION SCORE: .1
PSYCHOLOGICAL DISTRESS SCORE: 1.1
TOTAL RAPID3 SCORE: .28
PATIENT GLOBAL ASSESSMENT SCORE: 0
WHEN YOU AWAKENED IN THE MORNING OVER THE LAST WEEK, PLEASE INDICATE THE AMOUNT OF TIME IT TAKES UNTIL YOU ARE AS LIMBER AS YOU WILL BE FOR THE DAY: 1 HOUR
FATIGUE SCORE: 0
AM STIFFNESS SCORE: 1, YES
PAIN SCORE: .5

## 2019-12-16 NOTE — ASSESSMENT & PLAN NOTE
No hepatic or hematologic adverse effects  No unexpected infections  Will cont lab q3m (Jan and April)

## 2019-12-16 NOTE — ASSESSMENT & PLAN NOTE
Low disease activity vs remission on triple therapy  Tolerating therapy well  Very active; no limitations    Will cont same therapy for now

## 2019-12-16 NOTE — ASSESSMENT & PLAN NOTE
Reports that cevimeline holiday did demonstrate that the drug helps with xerostomia  Will cont same for now

## 2019-12-16 NOTE — PROGRESS NOTES
"Subjective:       Patient ID: Tom Sutherland is a 74 y.o. male.    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   HENT: Negative for trouble swallowing.    Eyes: Negative for redness.   Respiratory: Negative for cough and shortness of breath.    Cardiovascular: Negative for chest pain.   Gastrointestinal: Positive for constipation. Negative for diarrhea.   Genitourinary: Negative for genital sores.   Skin: Negative for rash.   Neurological: Negative for headaches.   Hematological: Does not bruise/bleed easily.         Objective:   BP (!) 145/86   Pulse 75   Ht 5' 11" (1.803 m)   Wt 84.9 kg (187 lb 2.7 oz)   BMI 26.11 kg/m²      Physical Exam   Constitutional: He is well-developed, well-nourished, and in no distress.   HENT:   Mouth/Throat: No oropharyngeal exudate.   Eyes: No scleral icterus.   Cardiovascular: Normal rate and regular rhythm.    Pulmonary/Chest: He has no wheezes. He has no rales.   Abdominal: There is no tenderness.   Lymphadenopathy:     He has no cervical adenopathy.   Skin: No rash noted.           12/16/2019   Tender (LOVE-28) 0 / 28    Swollen (LOVE-28) 0 / 28    Provider Global Assessment 1 (mm)   Patient Global Assessment 0 (mm)   ESR 29 (mm/hr)   CRP 17.7 (mg/L)   LOVE-28 (ESR) 2.36 (Remission)   LOVE-28 (CRP) 2.01 (Remission)     Lab Results   Component Value Date    SEDRATE 29 (H) 10/15/2019     Lab Results   Component Value Date    CRP 17.7 (H) 10/15/2019           Assessment:       1. Rheumatoid arthritis involving multiple sites with positive rheumatoid factor    2. " Sjogren's syndrome without extraglandular involvement    3. Methotrexate, long term, current use    4. Long-term use of Plaquenil            Plan:       Problem List Items Addressed This Visit        Active Problems    Rheumatoid arthritis involving multiple sites with positive rheumatoid factor (Chronic)     Low disease activity vs remission on triple therapy  Tolerating therapy well  Very active; no limitations    Will cont same therapy for now         Sjogren's syndrome (Chronic)     Reports that cevimeline holiday did demonstrate that the drug helps with xerostomia  Will cont same for now         Methotrexate, long term, current use     No hepatic or hematologic adverse effects  No unexpected infections  Will cont lab q3m (Jan and April)         Long-term use of Plaquenil     Has regular eye checks with no problem so can continue on this

## 2019-12-16 NOTE — PROGRESS NOTES
Rapid3 Question Responses and Scores 12/9/2019   MDHAQ Score 0.1   Psychologic Score 1.1   Pain Score 0.5   When you awakened in the morning OVER THE LAST WEEK, did you feel stiff? Yes   If Yes, please indicate the number of hours until you are as limber as you will be for the day 1   Fatigue Score 0   Global Health Score 0   RAPID3 Score 0.27

## 2020-01-16 ENCOUNTER — LAB VISIT (OUTPATIENT)
Dept: LAB | Facility: OTHER | Age: 75
End: 2020-01-16
Attending: INTERNAL MEDICINE
Payer: MEDICARE

## 2020-01-16 ENCOUNTER — PATIENT MESSAGE (OUTPATIENT)
Dept: RHEUMATOLOGY | Facility: CLINIC | Age: 75
End: 2020-01-16

## 2020-01-16 DIAGNOSIS — Z79.631 METHOTREXATE, LONG TERM, CURRENT USE: ICD-10-CM

## 2020-01-16 DIAGNOSIS — M05.79 RHEUMATOID ARTHRITIS INVOLVING MULTIPLE SITES WITH POSITIVE RHEUMATOID FACTOR: Chronic | ICD-10-CM

## 2020-01-16 LAB
ALBUMIN SERPL BCP-MCNC: 4 G/DL (ref 3.5–5.2)
ALP SERPL-CCNC: 74 U/L (ref 55–135)
ALT SERPL W/O P-5'-P-CCNC: 20 U/L (ref 10–44)
ANION GAP SERPL CALC-SCNC: 9 MMOL/L (ref 8–16)
AST SERPL-CCNC: 26 U/L (ref 10–40)
BASOPHILS # BLD AUTO: 0.04 K/UL (ref 0–0.2)
BASOPHILS NFR BLD: 0.8 % (ref 0–1.9)
BILIRUB SERPL-MCNC: 0.7 MG/DL (ref 0.1–1)
BUN SERPL-MCNC: 22 MG/DL (ref 8–23)
CALCIUM SERPL-MCNC: 9.5 MG/DL (ref 8.7–10.5)
CHLORIDE SERPL-SCNC: 102 MMOL/L (ref 95–110)
CO2 SERPL-SCNC: 29 MMOL/L (ref 23–29)
CREAT SERPL-MCNC: 1.1 MG/DL (ref 0.5–1.4)
CRP SERPL-MCNC: 2.9 MG/L (ref 0–8.2)
DIFFERENTIAL METHOD: ABNORMAL
EOSINOPHIL # BLD AUTO: 0.2 K/UL (ref 0–0.5)
EOSINOPHIL NFR BLD: 3.6 % (ref 0–8)
ERYTHROCYTE [DISTWIDTH] IN BLOOD BY AUTOMATED COUNT: 13.5 % (ref 11.5–14.5)
ERYTHROCYTE [SEDIMENTATION RATE] IN BLOOD: 15 MM/HR (ref 0–10)
EST. GFR  (AFRICAN AMERICAN): >60 ML/MIN/1.73 M^2
EST. GFR  (NON AFRICAN AMERICAN): >60 ML/MIN/1.73 M^2
GLUCOSE SERPL-MCNC: 97 MG/DL (ref 70–110)
HCT VFR BLD AUTO: 40.9 % (ref 40–54)
HGB BLD-MCNC: 13.8 G/DL (ref 14–18)
IMM GRANULOCYTES # BLD AUTO: 0.02 K/UL (ref 0–0.04)
IMM GRANULOCYTES NFR BLD AUTO: 0.4 % (ref 0–0.5)
LYMPHOCYTES # BLD AUTO: 1.2 K/UL (ref 1–4.8)
LYMPHOCYTES NFR BLD: 24.9 % (ref 18–48)
MCH RBC QN AUTO: 32.4 PG (ref 27–31)
MCHC RBC AUTO-ENTMCNC: 33.7 G/DL (ref 32–36)
MCV RBC AUTO: 96 FL (ref 82–98)
MONOCYTES # BLD AUTO: 0.6 K/UL (ref 0.3–1)
MONOCYTES NFR BLD: 12.3 % (ref 4–15)
NEUTROPHILS # BLD AUTO: 2.9 K/UL (ref 1.8–7.7)
NEUTROPHILS NFR BLD: 58 % (ref 38–73)
NRBC BLD-RTO: 0 /100 WBC
PLATELET # BLD AUTO: 147 K/UL (ref 150–350)
PMV BLD AUTO: 10.3 FL (ref 9.2–12.9)
POTASSIUM SERPL-SCNC: 3.8 MMOL/L (ref 3.5–5.1)
PROT SERPL-MCNC: 7 G/DL (ref 6–8.4)
RBC # BLD AUTO: 4.26 M/UL (ref 4.6–6.2)
SODIUM SERPL-SCNC: 140 MMOL/L (ref 136–145)
WBC # BLD AUTO: 4.97 K/UL (ref 3.9–12.7)

## 2020-01-16 PROCEDURE — 36415 COLL VENOUS BLD VENIPUNCTURE: CPT

## 2020-01-16 PROCEDURE — 85025 COMPLETE CBC W/AUTO DIFF WBC: CPT

## 2020-01-16 PROCEDURE — 86140 C-REACTIVE PROTEIN: CPT

## 2020-01-16 PROCEDURE — 80053 COMPREHEN METABOLIC PANEL: CPT

## 2020-01-16 PROCEDURE — 85651 RBC SED RATE NONAUTOMATED: CPT

## 2020-01-27 DIAGNOSIS — M05.79 RHEUMATOID ARTHRITIS INVOLVING MULTIPLE SITES WITH POSITIVE RHEUMATOID FACTOR: Chronic | ICD-10-CM

## 2020-01-27 RX ORDER — HYDROXYCHLOROQUINE SULFATE 200 MG/1
TABLET, FILM COATED ORAL
Qty: 180 TABLET | Refills: 4 | Status: SHIPPED | OUTPATIENT
Start: 2020-01-27 | End: 2020-05-04 | Stop reason: SDUPTHER

## 2020-02-14 DIAGNOSIS — M05.79 RHEUMATOID ARTHRITIS INVOLVING MULTIPLE SITES WITH POSITIVE RHEUMATOID FACTOR: ICD-10-CM

## 2020-02-14 RX ORDER — SULFASALAZINE 500 MG/1
TABLET, DELAYED RELEASE ORAL
Qty: 360 TABLET | Refills: 4 | Status: SHIPPED | OUTPATIENT
Start: 2020-02-14 | End: 2020-05-26 | Stop reason: SDUPTHER

## 2020-03-02 DIAGNOSIS — M05.79 RHEUMATOID ARTHRITIS INVOLVING MULTIPLE SITES WITH POSITIVE RHEUMATOID FACTOR: ICD-10-CM

## 2020-03-02 RX ORDER — METHOTREXATE 2.5 MG/1
TABLET ORAL
Qty: 52 TABLET | Refills: 0 | Status: SHIPPED | OUTPATIENT
Start: 2020-03-02 | End: 2020-05-31

## 2020-04-17 ENCOUNTER — LAB VISIT (OUTPATIENT)
Dept: LAB | Facility: OTHER | Age: 75
End: 2020-04-17
Attending: INTERNAL MEDICINE
Payer: MEDICARE

## 2020-04-17 DIAGNOSIS — Z79.631 METHOTREXATE, LONG TERM, CURRENT USE: ICD-10-CM

## 2020-04-17 DIAGNOSIS — M05.79 RHEUMATOID ARTHRITIS INVOLVING MULTIPLE SITES WITH POSITIVE RHEUMATOID FACTOR: Chronic | ICD-10-CM

## 2020-04-17 LAB
ALBUMIN SERPL BCP-MCNC: 4.1 G/DL (ref 3.5–5.2)
ALP SERPL-CCNC: 71 U/L (ref 55–135)
ALT SERPL W/O P-5'-P-CCNC: 26 U/L (ref 10–44)
ANION GAP SERPL CALC-SCNC: 9 MMOL/L (ref 8–16)
AST SERPL-CCNC: 34 U/L (ref 10–40)
BASOPHILS # BLD AUTO: 0.03 K/UL (ref 0–0.2)
BASOPHILS NFR BLD: 0.6 % (ref 0–1.9)
BILIRUB SERPL-MCNC: 0.6 MG/DL (ref 0.1–1)
BUN SERPL-MCNC: 20 MG/DL (ref 8–23)
CALCIUM SERPL-MCNC: 9 MG/DL (ref 8.7–10.5)
CHLORIDE SERPL-SCNC: 104 MMOL/L (ref 95–110)
CO2 SERPL-SCNC: 26 MMOL/L (ref 23–29)
CREAT SERPL-MCNC: 1 MG/DL (ref 0.5–1.4)
CRP SERPL-MCNC: 3 MG/L (ref 0–8.2)
DIFFERENTIAL METHOD: ABNORMAL
EOSINOPHIL # BLD AUTO: 0.1 K/UL (ref 0–0.5)
EOSINOPHIL NFR BLD: 2.7 % (ref 0–8)
ERYTHROCYTE [DISTWIDTH] IN BLOOD BY AUTOMATED COUNT: 13.2 % (ref 11.5–14.5)
ERYTHROCYTE [SEDIMENTATION RATE] IN BLOOD: 22 MM/HR (ref 0–10)
EST. GFR  (AFRICAN AMERICAN): >60 ML/MIN/1.73 M^2
EST. GFR  (NON AFRICAN AMERICAN): >60 ML/MIN/1.73 M^2
GLUCOSE SERPL-MCNC: 77 MG/DL (ref 70–110)
HCT VFR BLD AUTO: 42.2 % (ref 40–54)
HGB BLD-MCNC: 13.6 G/DL (ref 14–18)
IMM GRANULOCYTES # BLD AUTO: 0.02 K/UL (ref 0–0.04)
IMM GRANULOCYTES NFR BLD AUTO: 0.4 % (ref 0–0.5)
LYMPHOCYTES # BLD AUTO: 1.2 K/UL (ref 1–4.8)
LYMPHOCYTES NFR BLD: 23.7 % (ref 18–48)
MCH RBC QN AUTO: 32.1 PG (ref 27–31)
MCHC RBC AUTO-ENTMCNC: 32.2 G/DL (ref 32–36)
MCV RBC AUTO: 100 FL (ref 82–98)
MONOCYTES # BLD AUTO: 0.6 K/UL (ref 0.3–1)
MONOCYTES NFR BLD: 11.3 % (ref 4–15)
NEUTROPHILS # BLD AUTO: 3 K/UL (ref 1.8–7.7)
NEUTROPHILS NFR BLD: 61.3 % (ref 38–73)
NRBC BLD-RTO: 0 /100 WBC
PLATELET # BLD AUTO: 141 K/UL (ref 150–350)
PMV BLD AUTO: 10.5 FL (ref 9.2–12.9)
POTASSIUM SERPL-SCNC: 4.1 MMOL/L (ref 3.5–5.1)
PROT SERPL-MCNC: 7 G/DL (ref 6–8.4)
RBC # BLD AUTO: 4.24 M/UL (ref 4.6–6.2)
SODIUM SERPL-SCNC: 139 MMOL/L (ref 136–145)
WBC # BLD AUTO: 4.86 K/UL (ref 3.9–12.7)

## 2020-04-17 PROCEDURE — 36415 COLL VENOUS BLD VENIPUNCTURE: CPT

## 2020-04-17 PROCEDURE — 85651 RBC SED RATE NONAUTOMATED: CPT

## 2020-04-17 PROCEDURE — 86140 C-REACTIVE PROTEIN: CPT

## 2020-04-17 PROCEDURE — 85025 COMPLETE CBC W/AUTO DIFF WBC: CPT

## 2020-04-17 PROCEDURE — 80053 COMPREHEN METABOLIC PANEL: CPT

## 2020-05-03 ENCOUNTER — PATIENT MESSAGE (OUTPATIENT)
Dept: RHEUMATOLOGY | Facility: CLINIC | Age: 75
End: 2020-05-03

## 2020-05-03 DIAGNOSIS — M05.79 RHEUMATOID ARTHRITIS INVOLVING MULTIPLE SITES WITH POSITIVE RHEUMATOID FACTOR: Chronic | ICD-10-CM

## 2020-05-04 RX ORDER — HYDROXYCHLOROQUINE SULFATE 200 MG/1
200 TABLET, FILM COATED ORAL 2 TIMES DAILY
Qty: 180 TABLET | Refills: 1 | Status: SHIPPED | OUTPATIENT
Start: 2020-05-04 | End: 2020-12-03

## 2020-05-08 ENCOUNTER — PATIENT MESSAGE (OUTPATIENT)
Dept: RHEUMATOLOGY | Facility: CLINIC | Age: 75
End: 2020-05-08

## 2020-05-22 ENCOUNTER — PATIENT MESSAGE (OUTPATIENT)
Dept: RHEUMATOLOGY | Facility: CLINIC | Age: 75
End: 2020-05-22

## 2020-05-26 ENCOUNTER — PATIENT MESSAGE (OUTPATIENT)
Dept: RHEUMATOLOGY | Facility: CLINIC | Age: 75
End: 2020-05-26

## 2020-05-26 DIAGNOSIS — M05.79 RHEUMATOID ARTHRITIS INVOLVING MULTIPLE SITES WITH POSITIVE RHEUMATOID FACTOR: Primary | Chronic | ICD-10-CM

## 2020-05-26 DIAGNOSIS — M05.79 RHEUMATOID ARTHRITIS INVOLVING MULTIPLE SITES WITH POSITIVE RHEUMATOID FACTOR: ICD-10-CM

## 2020-05-26 RX ORDER — SULFASALAZINE 500 MG/1
1000 TABLET, DELAYED RELEASE ORAL 2 TIMES DAILY
Qty: 360 TABLET | Refills: 1 | Status: SHIPPED | OUTPATIENT
Start: 2020-05-26 | End: 2020-06-23 | Stop reason: SDUPTHER

## 2020-05-26 RX ORDER — FAMOTIDINE 20 MG/1
20 TABLET, FILM COATED ORAL NIGHTLY PRN
COMMUNITY
Start: 2020-05-22 | End: 2023-07-28

## 2020-05-26 RX ORDER — SULFASALAZINE 500 MG/1
1000 TABLET, DELAYED RELEASE ORAL 2 TIMES DAILY
Qty: 120 TABLET | Refills: 0 | Status: SHIPPED | OUTPATIENT
Start: 2020-05-26 | End: 2020-06-23 | Stop reason: SDUPTHER

## 2020-05-26 RX ORDER — LOSARTAN POTASSIUM 50 MG/1
TABLET ORAL
COMMUNITY
Start: 2020-05-08 | End: 2020-07-10 | Stop reason: DRUGHIGH

## 2020-05-26 RX ORDER — HYDROCHLOROTHIAZIDE 12.5 MG/1
CAPSULE ORAL
COMMUNITY
Start: 2020-04-30 | End: 2020-06-02

## 2020-05-27 ENCOUNTER — PATIENT MESSAGE (OUTPATIENT)
Dept: RHEUMATOLOGY | Facility: CLINIC | Age: 75
End: 2020-05-27

## 2020-05-31 DIAGNOSIS — M05.79 RHEUMATOID ARTHRITIS INVOLVING MULTIPLE SITES WITH POSITIVE RHEUMATOID FACTOR: ICD-10-CM

## 2020-05-31 RX ORDER — METHOTREXATE 2.5 MG/1
TABLET ORAL
Qty: 52 TABLET | Refills: 0 | Status: SHIPPED | OUTPATIENT
Start: 2020-05-31 | End: 2020-09-02 | Stop reason: SDUPTHER

## 2020-06-02 ENCOUNTER — OFFICE VISIT (OUTPATIENT)
Dept: CARDIOLOGY | Facility: CLINIC | Age: 75
End: 2020-06-02
Payer: MEDICARE

## 2020-06-02 VITALS
SYSTOLIC BLOOD PRESSURE: 148 MMHG | BODY MASS INDEX: 25.46 KG/M2 | HEART RATE: 80 BPM | WEIGHT: 181.88 LBS | HEIGHT: 71 IN | DIASTOLIC BLOOD PRESSURE: 78 MMHG

## 2020-06-02 DIAGNOSIS — Z79.899 LONG-TERM USE OF PLAQUENIL: ICD-10-CM

## 2020-06-02 DIAGNOSIS — M05.79 RHEUMATOID ARTHRITIS INVOLVING MULTIPLE SITES WITH POSITIVE RHEUMATOID FACTOR: Chronic | ICD-10-CM

## 2020-06-02 DIAGNOSIS — I10 ESSENTIAL HYPERTENSION: ICD-10-CM

## 2020-06-02 DIAGNOSIS — I34.0 NON-RHEUMATIC MITRAL REGURGITATION: Primary | ICD-10-CM

## 2020-06-02 PROCEDURE — 99214 OFFICE O/P EST MOD 30 MIN: CPT | Mod: S$PBB,,, | Performed by: INTERNAL MEDICINE

## 2020-06-02 PROCEDURE — 99213 OFFICE O/P EST LOW 20 MIN: CPT | Mod: PBBFAC | Performed by: INTERNAL MEDICINE

## 2020-06-02 PROCEDURE — 99999 PR PBB SHADOW E&M-EST. PATIENT-LVL III: CPT | Mod: PBBFAC,,, | Performed by: INTERNAL MEDICINE

## 2020-06-02 PROCEDURE — 99999 PR PBB SHADOW E&M-EST. PATIENT-LVL III: ICD-10-PCS | Mod: PBBFAC,,, | Performed by: INTERNAL MEDICINE

## 2020-06-02 PROCEDURE — 99214 PR OFFICE/OUTPT VISIT, EST, LEVL IV, 30-39 MIN: ICD-10-PCS | Mod: S$PBB,,, | Performed by: INTERNAL MEDICINE

## 2020-06-02 RX ORDER — LOSARTAN POTASSIUM AND HYDROCHLOROTHIAZIDE 12.5; 5 MG/1; MG/1
1 TABLET ORAL DAILY
COMMUNITY
End: 2020-06-02

## 2020-06-02 RX ORDER — HYDROCHLOROTHIAZIDE 25 MG/1
25 TABLET ORAL DAILY
Qty: 90 TABLET | Refills: 3 | Status: SHIPPED | OUTPATIENT
Start: 2020-06-02 | End: 2021-06-30 | Stop reason: SDUPTHER

## 2020-06-02 NOTE — PROGRESS NOTES
Subjective:   Patient ID:  Tom Sutherland is a 75 y.o. male who presents for follow up of Non-rheumatic mitral regurgitation`      HPI: Routine f/u after 20 months. Still walking his dog as normal.  Notices dyspnea only when he pushes himself, but that's not new.    He is doing well with no new symptoms or cardiovascular complaints and no change in exercise capacity.  He denies chest discomfort, MAZARIEGOS, palpitations, PND/orthopnea, lightheadedness and syncope.      Oct 2018 HPI: Routine yearly f/u.  Walking 18 blocks with his dog and having no problems at all.  RA control has been good too.  Blood pressure at home has been normal.     He is doing well with no new symptoms or cardiovascular complaints and no change in exercise capacity.  He denies chest discomfort, MAZARIEGOS, palpitations, PND/orthopnea, lightheadedness and syncope.       Oct 2017 HPI (DANA Maria): Mr. Sutherland is a 71yo male with HTN, RA, Sjogren, history of prostate CA here for evaluation of mitral regurgitation. Murmur over the apex was heard during a pre-operative clearance prior to prostate resection surgery at [facility], which resulted in an echo that indicated normal EF, moderate-severe MR, normal IVC, enlarged LA, normal LV. Exercise stress test was negative for ischemia. Today Mr. Sutherland is here to establish care. He has no cardiac complaints. Mr. Sutherland denies chest pain with exertion or at rest, palpitations, SOB, MAZARIEGOS, dizziness, syncope, leg edema, claudication, PND, or orthopnea. He is active and walks with his wife for miles without MAZARIEGOS. He can easily ascend several flights of stairs without stopping for dyspnea. He experiences no limitations in his activity tolerance.  He is currently taking ASA 81mg and losartan-HCTZ 50-12.5. He does not take his BP at home but does have a cuff. Repeat BP was 158/82, but other clinic measures show most pressures under 140/90. Creatinine is 1.1 on 9/21/2017. Hepatic transaminases are within normal  limits.    Patient Active Problem List   Diagnosis    Rheumatoid arthritis involving multiple sites with positive rheumatoid factor    Methotrexate, long term, current use    Long-term use of Plaquenil    Sjogren's syndrome    Non-rheumatic mitral regurgitation    Essential hypertension    History of prostate cancer       Current Outpatient Medications   Medication Sig    aspirin (ECOTRIN) 81 MG EC tablet Take 81 mg by mouth once daily.    cevimeline (EVOXAC) 30 mg capsule Take 1 capsule (30 mg total) by mouth 3 (three) times daily.    CIALIS 5 mg tablet TK 1 T PO QD    famotidine (PEPCID) 20 MG tablet 20 mg nightly as needed.     fluticasone (FLONASE) 50 mcg/actuation nasal spray daily as needed.     folic acid (FOLVITE) 1 MG tablet TAKE 2 TABLETS ONCE DAILY    hydroxychloroquine (PLAQUENIL) 200 mg tablet Take 1 tablet (200 mg total) by mouth 2 (two) times daily.    leucovorin (WELLCOVORIN) 5 mg Tab Take 1 tablet (5 mg total) by mouth once daily.    losartan-hydrochlorothiazide 50-12.5 mg (HYZAAR) 50-12.5 mg per tablet Take 1 tablet by mouth once daily.    meloxicam (MOBIC) 7.5 MG tablet Take 1 tablet (7.5 mg total) by mouth once daily.    methotrexate 2.5 MG Tab TAKE 4 TABLETS EVERY 7 DAYS    multivitamin (ONE DAILY MULTIVITAMIN) per tablet Take 1 tablet by mouth once daily.    PATANOL 0.1 % ophthalmic solution daily as needed.     sulfaSALAzine (AZULFIDINE EN-TABS) 500 MG TbEC Take 2 tablets (1,000 mg total) by mouth 2 (two) times daily.    hydroCHLOROthiazide (MICROZIDE) 12.5 mg capsule     losartan (COZAAR) 50 MG tablet     sulfaSALAzine (AZULFIDINE) 500 MG TbEC Take 2 tablets (1,000 mg total) by mouth 2 (two) times daily. (Patient not taking: Reported on 6/2/2020)     No current facility-administered medications for this visit.        Review of Systems   Constitution: Negative.   HENT: Negative.    Eyes: Negative.    Cardiovascular: Negative.  Negative for chest pain, dyspnea on  exertion, near-syncope, orthopnea and palpitations.   Respiratory: Negative.  Negative for cough and shortness of breath.    Endocrine: Negative.    Hematologic/Lymphatic: Negative.    Skin: Negative.    Musculoskeletal: Negative.    Gastrointestinal: Negative.    Genitourinary: Negative.    Neurological: Negative.    Psychiatric/Behavioral: Negative.      Objective:   Physical Exam   Constitutional: He is oriented to person, place, and time. He appears well-developed and well-nourished.   HENT:   Head: Normocephalic and atraumatic.   Mouth/Throat: Oropharynx is clear and moist.   Eyes: Conjunctivae and EOM are normal. No scleral icterus.   Neck: Normal range of motion. Neck supple. No JVD present.   Cardiovascular: Normal rate, regular rhythm and intact distal pulses. Exam reveals no gallop and no friction rub.   Murmur (Very soft murmur heard diffusely in the left chest) heard.  Pulmonary/Chest: Effort normal and breath sounds normal. He has no wheezes. He has no rales.   Abdominal: Soft. Bowel sounds are normal. He exhibits no distension. There is no tenderness.   Musculoskeletal: Normal range of motion. He exhibits no edema.   Neurological: He is alert and oriented to person, place, and time.   Skin: Skin is warm and dry. No rash noted. No erythema.   Psychiatric: He has a normal mood and affect. His behavior is normal. Judgment and thought content normal.   Vitals reviewed.      Lab Results   Component Value Date    WBC 4.86 04/17/2020    HGB 13.6 (L) 04/17/2020    HCT 42.2 04/17/2020     (H) 04/17/2020     (L) 04/17/2020         Chemistry        Component Value Date/Time     04/17/2020 0930    K 4.1 04/17/2020 0930     04/17/2020 0930    CO2 26 04/17/2020 0930    BUN 20 04/17/2020 0930    CREATININE 1.0 04/17/2020 0930    GLU 77 04/17/2020 0930        Component Value Date/Time    CALCIUM 9.0 04/17/2020 0930    ALKPHOS 71 04/17/2020 0930    AST 34 04/17/2020 0930    ALT 26 04/17/2020  0930    BILITOT 0.6 04/17/2020 0930    ESTGFRAFRICA >60 04/17/2020 0930    EGFRNONAA >60 04/17/2020 0930            No results found for: CHOL  No results found for: HDL  No results found for: LDLCALC  No results found for: TRIG  No results found for: CHOLHDL    No results found for: TSH    No results found for: HGBA1C    Assessment:     1. Non-rheumatic mitral regurgitation    2. Essential hypertension    3. Rheumatoid arthritis involving multiple sites with positive rheumatoid factor    4. Long-term use of Plaquenil        Plan:     Continue current medicines.  Increase HCTZ to 25.  Digital HTN enrollment    Diet/exercise goals reinforced.    Hand washing and social distancing stressed.    F/U 12 months

## 2020-06-09 ENCOUNTER — PATIENT MESSAGE (OUTPATIENT)
Dept: RHEUMATOLOGY | Facility: CLINIC | Age: 75
End: 2020-06-09

## 2020-06-09 DIAGNOSIS — M35.01 SJOGREN'S SYNDROME WITH KERATOCONJUNCTIVITIS SICCA: ICD-10-CM

## 2020-06-10 RX ORDER — CEVIMELINE HYDROCHLORIDE 30 MG/1
30 CAPSULE ORAL 3 TIMES DAILY
Qty: 180 CAPSULE | Refills: 0 | Status: SHIPPED | OUTPATIENT
Start: 2020-06-10 | End: 2020-07-21 | Stop reason: SDUPTHER

## 2020-06-22 ENCOUNTER — PATIENT OUTREACH (OUTPATIENT)
Dept: OTHER | Facility: OTHER | Age: 75
End: 2020-06-22

## 2020-06-22 NOTE — LETTER
June 25, 2020     Tom Sutherland  [address] [facility] [address]       Dear Tom,    Welcome to [facility]! Our goal is to make care effective, proactive and convenient by using data you send us from home to better treat your chronic conditions.              My name is Roberto Fink, and I am your dedicated Digital Medicine clinician. As an expert in medication management, I will help ensure that the medications you are taking continue to provide the intended benefits and help you reach your goals. You can reach me directly at [phone] or by sending me a message directly through your [facility] account.      I am Bianka Montoya and I will be your health . My job is to help you identify lifestyle changes to improve your disease control. We will talk about nutrition, exercise, and other ways you may be able to adjust your current habits to better your health. Additionally, we will help ensure you are completing the tests and screenings that are necessary to help manage your conditions. You can reach me directly at  or by sending me a message directly through your [facility] account.    Most importantly, YOU are at the center of this team. Together, we will work to improve your overall health and encourage you to meet your goals for a healthier lifestyle.     What we expect from YOU:  · Please take frequent home blood pressure measurements. We ask that you take at least 1 blood pressure reading per week, but more information will better help us get you know you. Be sure you rest for a few minutes before taking the reading in a quiet, comfortable place.     Be available to receive phone calls or [employer] messages, when appropriate, from your care team. Please let us know if there are any specific days or times that work best for us to reach you via phone.     Complete routine tests and screenings. Dont worry, we will help keep you on track!           What you should expect from your  Digital Medicine Care Team:   We will work with you to create a personalized plan of care and provide you with encouragement and education, including regarding lifestyle changes, that could help you manage your disease states.     We will adjust your current medications, if needed, and continue to monitor your long-term progress.     We will provide you and your physician with monthly progress reports after you have been in the program for more than 30 days.     We will send you reminders through [employer] and text messages to help ensure you do not miss any testing deadlines to help manage your disease states.    You will be able to reach us by phone or through your [employer] account by clicking our names under Care Team on the right side of the home screen.    I look forward to working with you to achieve your blood pressure goals!    We look forward to working with you to help manage your health,    Sincerely,    Your Digital Medicine Team    Please visit our websites to learn more:   · Hypertension: [email or web address]      Remember, we are not available for emergencies. If you have an emergency, please contact your doctors office directly or call [facility] on-call ([phone] or [phone]) or [phone].

## 2020-06-22 NOTE — LETTER
June 25, 2020              Quinten Porter MD  [address]  [address]  [facility] [address]         Dear Quinten Porter MD,           Your patient Tom Sutherland recently enrolled in [facility]s Hypertension [facility] program. The purpose of this letter is to provide you with information about the program, so you can be fully informed.    Background  Hypertension remains the most common chronic disease and is one of the leading risk factors leading to death and morbidity in the [place].1,2 Of the 45% of [place] adults with hypertension, only 50% are under control, exposing these individuals to higher risks of stroke, myocardial infarction and renal failure. [place] and the [facility] are particularly vulnerable, with hypertension prevalence rates exceeding 50% of the adult population.    Current guidelines recommend multi-disciplinary team-based care that incorporates active lifestyle coaching and pharmacotherapy; this method has proven to be more effective in achieving and maintaining blood pressure control than traditional care.2 As such, [facility] has developed a program that incorporates these recommendations into a comprehensive care program for patients with hypertension.    Hypertension Digital Medicine   Hypertension Digital Medicine was launched in 2015, and today is actively managing thousands of patients. The program uses FDA-approved connected devices that automatically transmit home readings directly to [facility]s [facility] Care Team, composed of clinicians (Doctors of Pharmacy - PharmDs or Advanced Practice Providers - APPs) and Health Coaches. The Care Team actively manages all pharmacotherapy as well as provides ongoing lifestyle (dietary and physical activity) support and education based on current national guidelines.3 Patients enrolled in the program achieve higher rates (typically 2-3 fold) of blood pressure control compared to usual care, with additional improvements seen in  patient activation, satisfaction, and quality of life.4     How to Stay Informed  We want to keep you informed about Toms progress in the program. Digital Medicine program information can be viewed by accessing a patients chart in [employer].      To adjust your email notifications, sign into [employer] and navigate to the Settings menu.  Under Notification Preferences, set that you would like to receive email notifications and the number of days between emails.  Click the Save button to store your settings and keep up-to-date on important patient updates.     If you do not have an [facility] account, you can create an account. To sign up:   Contact [facility] by emailing [email or web address] or calling [phone].   External Access will send you an access agreement. Complete and sign the access agreement including a confidentiality agreement for each person who needs access.   Return the signed access and confidentiality agreements to External Access.   Within 5 business days of receiving the signed access agreement, [facility] will contact you to make arrangements for set up and provide support documentation.     We have enclosed a brochure that further describes [facility]. If you have any questions, please dont hesitate to contact us at [phone] for more information.      Thank you,   [facility]    ---------------------------------------  1. Nargis AUSTIN, Myron A, Paul S, Marisela MC, International Society of H. The global cost of nonoptimal blood pressure. J Hypertens. 2009;27(7):4951-6507.    2. Gabeon PK, Hayden RM, Qi WS, et al. 2017 ACC/AHA/AAPA/ABC/ACPM/AGS/APhA/SILVERIO/ASPC/NMA/PCNA Guideline for the Prevention, Detection, Evaluation, and Management of High Blood Pressure in Adults: A Report of the American College of Cardiology/American Heart Association Task Force on Clinical Practice Guidelines. Circulation.  2018;138(17):n253-y576.    3. Natalya WATSON, Jocelyn MCELROY, Tanika MONTEZ, Kevin MONDRAGON, Crispin VENTURA. New Concepts in Hypertension Management: A Population-Based Perspective. Progress in cardiovascular diseases. 2016;59(3):289-294.    4. Jocelyn Meyer, Kevin MONDRAGON, Crispin Rawls. Improving Hypertension Control and Patient Engagement Using Digital Tools. The American journal of medicine. 2017;130(1):14-20.

## 2020-06-23 DIAGNOSIS — M05.79 RHEUMATOID ARTHRITIS INVOLVING MULTIPLE SITES WITH POSITIVE RHEUMATOID FACTOR: ICD-10-CM

## 2020-06-23 RX ORDER — SULFASALAZINE 500 MG/1
1000 TABLET, DELAYED RELEASE ORAL 2 TIMES DAILY
Qty: 360 TABLET | Refills: 1 | Status: SHIPPED | OUTPATIENT
Start: 2020-06-23 | End: 2020-07-08 | Stop reason: SDUPTHER

## 2020-06-25 NOTE — PROGRESS NOTES
Digital Medicine: Health  Introduction    Introduced Tom Sutherland to Digital Medicine. Discussed health  role and recommended lifestyle modifications.    Confirmed posture, resting before readings and timing with meds.         HYPERTENSION  Our goal is to get BP to consistently below 130/80mmHg and make the process convenient so patient can avoid extra trips to the office. Getting your blood pressure below 130/80mmHg (definition of control) will reduce your risk for heart attack, kidney failure, stroke and death (as well as kidney failure, eye disease, & dementia)      Reviewed that the Digital Medicine care team - consisting of a clinician and a health  - will follow the most current evidence-based national guidelines for treating your condition.  The health  will focus on lifestyle modifications and motivation while the clinician will focus on medication therapy.  The care team will review all data on a regular basis and reach out as needed.      Explained that one of the key parts of the program is communication with the care team.  Asked patient to respond to outreach attempts and complete questionnaires.  Stressed importance of medication adherence.    Explained that we expect patient to obtain several blood pressures per week at random times of day.  Instructed patient not to allow anyone else to use phone and monitoring device.  Confirmed appropriate BP monitoring technique.      Explained to patient that the digital medicine team is not available for emergencies.  Patient will call [facility] on-call ([phone] or [phone]) or [phone] if needed.      Patient's BP goal is 130/80.Patient's BP average is 151/87 mmHg, which is above goal, per 2017 ACC/AHA Hypertension Guidelines.          Last 5 Patient Entered Readings                                      Current 30 Day Average: 151/87     Recent Readings 6/25/2020 6/24/2020 6/24/2020 6/24/2020 6/24/2020    SBP (mmHg) 147 147 174 162 133     DBP (mmHg) 88 77 99 90 81    Pulse 78 76 73 72 73            Intervention/Plan        Topic    Lipid (Cholesterol) Test        Reviewed the importance of self-monitoring, medication adherence, and that the health  can be used as a resource for lifestyle modifications to help reduce or maintain a healthy lifestyle.    Sent link to [facility] [employer] webpages and my contact information via [email or web address] for future questions. Follow up scheduled.         Screenings    SDOH

## 2020-07-08 DIAGNOSIS — M05.79 RHEUMATOID ARTHRITIS INVOLVING MULTIPLE SITES WITH POSITIVE RHEUMATOID FACTOR: ICD-10-CM

## 2020-07-08 RX ORDER — SULFASALAZINE 500 MG/1
1000 TABLET, DELAYED RELEASE ORAL 2 TIMES DAILY
Qty: 360 TABLET | Refills: 0 | Status: SHIPPED | OUTPATIENT
Start: 2020-07-08 | End: 2020-11-27

## 2020-07-10 ENCOUNTER — PATIENT OUTREACH (OUTPATIENT)
Dept: OTHER | Facility: OTHER | Age: 75
End: 2020-07-10

## 2020-07-10 DIAGNOSIS — I10 ESSENTIAL HYPERTENSION: Primary | ICD-10-CM

## 2020-07-10 RX ORDER — LOSARTAN POTASSIUM 100 MG/1
100 TABLET ORAL DAILY
Qty: 90 TABLET | Refills: 1 | Status: SHIPPED | OUTPATIENT
Start: 2020-07-10 | End: 2020-07-10 | Stop reason: CLARIF

## 2020-07-10 RX ORDER — LOSARTAN POTASSIUM 100 MG/1
100 TABLET ORAL DAILY
Qty: 90 TABLET | Refills: 1 | Status: SHIPPED | OUTPATIENT
Start: 2020-07-10 | End: 2020-12-30 | Stop reason: SDUPTHER

## 2020-07-10 NOTE — PROGRESS NOTES
Digital Medicine: Clinician Introduction    Tom Sutherland is a 75 y.o. male who is newly enrolled in the Digital Medicine Clinic.    The following information was reviewed and updated:  Preferred pharmacy   [facility]  for [facility] - [place], [place] - [address] [place] [address]  Phone: [phone] Fax: [phone]    [facility] - [place], [place] - [address] [place] [address]  [address] [facility] [address]  Phone: [phone] Fax: [phone]    [facility] - [place], [place] - [address]  [address]  [place] [address]  Phone: [phone] Fax: [phone]    [id number] - [place], [place] - [address] AT [facility] [address]  [address]  [place] [address]  Phone: [phone] Fax: [phone]      Patient prefers a 90 days supply.     Review of patient's allergies indicates:  No Known Allergies    Patient reports doing well without major concern.    Hyper-/hypotensive symptoms: denies    Medication issues/non-adherence: denies    Blood pressure cuff issues: denies    Diet: kids helped them start eating healthier recently, avoids soda, tries to get enough water during the day    Physical activity: walks 20 blocks a day with dog          The history is provided by the patient.     HYPERTENSION  Our goal is to get BP to consistently below 130/80mmHg and make the process convenient so patient can avoid extra trips to the office. Getting your blood pressure below 130/80mmHg (definition of control) will reduce your risk for heart attack, kidney failure, stroke and death (as well as kidney failure, eye disease, & dementia)      Reviewed non-pharmacologic therapies and impact on BP      Explained that we expect patient to obtain several blood pressures per week at random times of day.  Instructed patient not to allow anyone else to use phone and monitoring device.   Confirmed appropriate BP monitoring technique.      Explained to patient that the digital medicine team is not available for emergencies.  Patient will call [facility] on-call ([phone] or [phone]) or [phone] if needed.    Patient's BP goal is 130/80. Patients BP average is 152/86 mmHg, which is above goal, per 2017 ACC/AHA Hypertension Guidelines.    Patient is not experiencing symptoms.    Allergies reviewed.      Last 5 Patient Entered Readings                                      Current 30 Day Average: 152/86     Recent Readings 7/10/2020 7/9/2020 7/7/2020 7/6/2020 7/5/2020    SBP (mmHg) 131 164 152 145 162    DBP (mmHg) 76 90 80 76 99    Pulse 72 68 79 75 76            INTERVENTION(S)  reviewed appropriate dose schedule, reviewed monitoring technique, recommended med change, encouragement/support and denied questions    PLAN  patient verbalizes understanding, demonstrates understanding via teach back, patient amenable to changes and continue monitoring    -Blood pressure is Uncontrolled per recent readings  -Current regimen is appropriate but inadequate for control.  Maximize losartan dose.  -He prefers using the [facility] pharmacy at [address] because he has had issues with [facility] before    -Medication changes as follows: increase losartan to 100 mg once daily            Topic    Lipid (Cholesterol) Test        Current Medication Regimen:  Hypertension Medications             hydroCHLOROthiazide (HYDRODIURIL) 25 MG tablet Take 1 tablet (25 mg total) by mouth once daily.    losartan (COZAAR) 50 MG tablet             Reviewed the importance of self-monitoring, medication adherence, and that the health  can be used as a resource for lifestyle modifications to help reduce or maintain a healthy lifestyle.    Sent link to [facility] webpages and my contact information via [employer] for future questions. Follow up scheduled.             Sleep Apnea Screening  Patient previously  diagnosed with FRANNY He reports he is not currently using CPAP.         Medication Adherence Screening   He did not miss a dose this month.  Patient knows purpose of medications.      Patient identified the following reasons for non-compliance: None    Adherence tools used: Pill box

## 2020-07-16 ENCOUNTER — PATIENT OUTREACH (OUTPATIENT)
Dept: OTHER | Facility: OTHER | Age: 75
End: 2020-07-16

## 2020-07-16 NOTE — PROGRESS NOTES
Digital Medicine: Health  Follow-Up    The history is provided by the patient.         Reason for review: Blood pressure not at goal        Topics Covered on Call: physical activity    Additional Follow-up details: Brief encounter due to patient at the hospital for his wife, who is having an infusion. He discusses her dx of stage 4 breast cancer. We discussed patient's activity routine, but did not assess eating habits or goals further at this time.         Diet-Not assessed      Additional diet details:    Physical Activity-Change  7 day(s) a week.     He physical activity details: Patient walks the dog about 20 blocks, and does some yardwork as well. He jokes that he doesn't like to sit still.           PLAN  Continue current diet/physical activity routine:    Patient verbalizes understanding. Patient did not express questions or concerns and patient has contact information if needed.    Explained the importance of self-monitoring and medication adherence. Encouraged the patient to communicate with their health  for lifestyle modifications to help improve or maintain a healthy lifestyle.            Topic    Lipid (Cholesterol) Test        Last 5 Patient Entered Readings                                      Current 30 Day Average: 151/86     Recent Readings 7/16/2020 7/15/2020 7/11/2020 7/10/2020 7/9/2020    SBP (mmHg) 141 141 140 131 164    DBP (mmHg) 86 78 81 76 90    Pulse 78 81 75 72 68

## 2020-07-24 ENCOUNTER — PATIENT OUTREACH (OUTPATIENT)
Dept: OTHER | Facility: OTHER | Age: 75
End: 2020-07-24

## 2020-07-24 NOTE — PROGRESS NOTES
Digital Medicine: Clinician Follow-Up    Pt reports doing well without concern.    The history is provided by the patient.   Follow-up reason(s): medication change follow-up.     Patient readings are stable   Patient is not experiencing signs/symptoms of hypertension.    Patient did make medication change.    Is patient tolerating med change?: yes      Additional Follow-up details:   -Confirms takings two losartan 50 mg tablets after last visit until he picked up the 100 mg tablets yesterday.  -Notes lower activity recently due to weather       Last 5 Patient Entered Readings                                      Current 30 Day Average: 151/86     Recent Readings 7/23/2020 7/23/2020 7/22/2020 7/21/2020 7/19/2020    SBP (mmHg) 174 173 146 139 161    DBP (mmHg) 102 90 77 82 85    Pulse 80 83 87 80 77             Screenings    ASSESSMENT(S)  Patients BP average is 151/86 mmHg, which is above goal. Patient's BP goal is less than or equal to 130/80 per 2017 ACC/AHA Hypertension Guidelines.       PLAN  Patient declined medication changes: Pt prefers to get more readings before making another medication decisions.  Asymptomatic so okay with this.  Gave him clear instructions to reach out if this changes.  Patient verbalizes understanding. Patient did not express questions or concerns and patient has contact information if needed.              Topic    Lipid (Cholesterol) Test          Hypertension Medications             hydroCHLOROthiazide (HYDRODIURIL) 25 MG tablet Take 1 tablet (25 mg total) by mouth once daily.    losartan (COZAAR) 100 MG tablet Take 1 tablet (100 mg total) by mouth once daily.

## 2020-08-07 ENCOUNTER — PATIENT OUTREACH (OUTPATIENT)
Dept: OTHER | Facility: OTHER | Age: 75
End: 2020-08-07

## 2020-08-07 NOTE — PROGRESS NOTES
Digital Medicine: Clinician Follow-Up    Patient reports doing well without concern.      The history is provided by the patient.   Follow-up reason(s): routine follow up.     Hypertension    Patient readings are stable Patient is not experiencing signs/symptoms of hypertension.      Additional Follow-up details: Activity more normal last 2 weeks.      Last 5 Patient Entered Readings                                      Current 30 Day Average: 149/84     Recent Readings 8/4/2020 8/2/2020 8/1/2020 7/31/2020 7/29/2020    SBP (mmHg) 141 145 155 139 155    DBP (mmHg) 79 74 83 89 84    Pulse 76 74 71 89 73             Screenings    ASSESSMENT(S)  Patients BP average is 149/84 mmHg, which is above goal. Patient's BP goal is less than or equal to 130/80 per 2017 ACC/AHA Hypertension Guidelines.       Hypertension Plan  Placed task for health  follow up.  Patient declined medication changes. Asymptomatic.  Defer changes for now.  Give pt some time to make additional lifestyle improvements.  Otherwise, will need to start amlodipine or at least switch to more potent ARB.       Addressed any questions or concerns and patient has my contact information if needed prior to next outreach. Patient verbalizes understanding.                Topic    Lipid (Cholesterol) Test          Hypertension Medications             hydroCHLOROthiazide (HYDRODIURIL) 25 MG tablet Take 1 tablet (25 mg total) by mouth once daily.    losartan (COZAAR) 100 MG tablet Take 1 tablet (100 mg total) by mouth once daily.

## 2020-08-10 ENCOUNTER — PATIENT OUTREACH (OUTPATIENT)
Dept: OTHER | Facility: OTHER | Age: 75
End: 2020-08-10

## 2020-08-25 NOTE — PROGRESS NOTES
Digital Medicine: Health  Follow-Up    The history is provided by the patient.             Reason for review: Blood pressure not at goal        Topics Covered on Call: tech support    Additional Follow-up details: Called patient per pharmD task regarding brainstorming lifestyle modification to improve BP readings. Patient did not discuss it at this time, and describes that he feels his monitor is inaccurate because [place] office [place] tend to be in the 120's. Patient has an upcoming [facility] office visit on 9/11 and agrees to bring his cuff with him for comparison, and [facility] if more than a 20 point difference.     Encouraged resting before readings and charging monitor every 2 weeks    WCB after this appt to follow up.         Diet-Not assessed          Physical Activity-Not assessed    Medication Adherence-Medication Adherence not addressed.      Substance, Sleep, Stress-Not assessed      Additional monitoring needed.  Instructed to charge device.  Reviewed Device Techniques.     Addressed any questions or concerns and patient has my contact information if needed prior to next outreach. Patient verbalizes understanding.              Topic    Lipid (Cholesterol) Test        Last 5 Patient Entered Readings                                      Current 30 Day Average: 151/86     Recent Readings 8/24/2020 8/22/2020 8/21/2020 8/20/2020 8/20/2020    SBP (mmHg) 162 152 150 151 155    DBP (mmHg) 86 88 92 87 82    Pulse 76 81 77 81 73

## 2020-08-29 DIAGNOSIS — M05.79 RHEUMATOID ARTHRITIS INVOLVING MULTIPLE SITES WITH POSITIVE RHEUMATOID FACTOR: ICD-10-CM

## 2020-08-31 ENCOUNTER — TELEPHONE (OUTPATIENT)
Dept: RHEUMATOLOGY | Facility: CLINIC | Age: 75
End: 2020-08-31

## 2020-08-31 DIAGNOSIS — M05.79 RHEUMATOID ARTHRITIS INVOLVING MULTIPLE SITES WITH POSITIVE RHEUMATOID FACTOR: Primary | Chronic | ICD-10-CM

## 2020-08-31 RX ORDER — METHOTREXATE 2.5 MG/1
TABLET ORAL
Qty: 52 TABLET | Refills: 3 | OUTPATIENT
Start: 2020-08-31

## 2020-08-31 NOTE — TELEPHONE ENCOUNTER
----- Message from Breanna Reese RN sent at 8/31/2020  5:35 PM CDT -----  Please put lab orders in epic

## 2020-09-02 ENCOUNTER — PATIENT MESSAGE (OUTPATIENT)
Dept: RHEUMATOLOGY | Facility: CLINIC | Age: 75
End: 2020-09-02

## 2020-09-02 RX ORDER — METHOTREXATE 2.5 MG/1
10 TABLET ORAL
Qty: 16 TABLET | Refills: 0 | Status: SHIPPED | OUTPATIENT
Start: 2020-09-02 | End: 2020-09-11

## 2020-09-09 ENCOUNTER — LAB VISIT (OUTPATIENT)
Dept: LAB | Facility: OTHER | Age: 75
End: 2020-09-09
Attending: INTERNAL MEDICINE
Payer: MEDICARE

## 2020-09-09 DIAGNOSIS — M05.79 RHEUMATOID ARTHRITIS INVOLVING MULTIPLE SITES WITH POSITIVE RHEUMATOID FACTOR: Chronic | ICD-10-CM

## 2020-09-09 LAB
ALBUMIN SERPL BCP-MCNC: 4.4 G/DL (ref 3.5–5.2)
ALP SERPL-CCNC: 74 U/L (ref 55–135)
ALT SERPL W/O P-5'-P-CCNC: 23 U/L (ref 10–44)
ANION GAP SERPL CALC-SCNC: 11 MMOL/L (ref 8–16)
AST SERPL-CCNC: 27 U/L (ref 10–40)
BASOPHILS # BLD AUTO: 0.04 K/UL (ref 0–0.2)
BASOPHILS NFR BLD: 0.8 % (ref 0–1.9)
BILIRUB SERPL-MCNC: 0.6 MG/DL (ref 0.1–1)
BUN SERPL-MCNC: 27 MG/DL (ref 8–23)
CALCIUM SERPL-MCNC: 9.1 MG/DL (ref 8.7–10.5)
CHLORIDE SERPL-SCNC: 99 MMOL/L (ref 95–110)
CO2 SERPL-SCNC: 26 MMOL/L (ref 23–29)
CREAT SERPL-MCNC: 1.1 MG/DL (ref 0.5–1.4)
CRP SERPL-MCNC: 1.4 MG/L (ref 0–8.2)
DIFFERENTIAL METHOD: ABNORMAL
EOSINOPHIL # BLD AUTO: 0.2 K/UL (ref 0–0.5)
EOSINOPHIL NFR BLD: 4.3 % (ref 0–8)
ERYTHROCYTE [DISTWIDTH] IN BLOOD BY AUTOMATED COUNT: 13.6 % (ref 11.5–14.5)
ERYTHROCYTE [SEDIMENTATION RATE] IN BLOOD: 6 MM/HR (ref 0–10)
EST. GFR  (AFRICAN AMERICAN): >60 ML/MIN/1.73 M^2
EST. GFR  (NON AFRICAN AMERICAN): >60 ML/MIN/1.73 M^2
GLUCOSE SERPL-MCNC: 89 MG/DL (ref 70–110)
HCT VFR BLD AUTO: 41.6 % (ref 40–54)
HGB BLD-MCNC: 14.3 G/DL (ref 14–18)
IMM GRANULOCYTES # BLD AUTO: 0.02 K/UL (ref 0–0.04)
IMM GRANULOCYTES NFR BLD AUTO: 0.4 % (ref 0–0.5)
LYMPHOCYTES # BLD AUTO: 1.1 K/UL (ref 1–4.8)
LYMPHOCYTES NFR BLD: 21.3 % (ref 18–48)
MCH RBC QN AUTO: 33.6 PG (ref 27–31)
MCHC RBC AUTO-ENTMCNC: 34.4 G/DL (ref 32–36)
MCV RBC AUTO: 98 FL (ref 82–98)
MONOCYTES # BLD AUTO: 0.6 K/UL (ref 0.3–1)
MONOCYTES NFR BLD: 12.8 % (ref 4–15)
NEUTROPHILS # BLD AUTO: 3 K/UL (ref 1.8–7.7)
NEUTROPHILS NFR BLD: 60.4 % (ref 38–73)
NRBC BLD-RTO: 0 /100 WBC
PLATELET # BLD AUTO: 145 K/UL (ref 150–350)
PMV BLD AUTO: 10.3 FL (ref 9.2–12.9)
POTASSIUM SERPL-SCNC: 3.9 MMOL/L (ref 3.5–5.1)
PROT SERPL-MCNC: 7.3 G/DL (ref 6–8.4)
RBC # BLD AUTO: 4.26 M/UL (ref 4.6–6.2)
SODIUM SERPL-SCNC: 136 MMOL/L (ref 136–145)
WBC # BLD AUTO: 4.94 K/UL (ref 3.9–12.7)

## 2020-09-09 PROCEDURE — 80053 COMPREHEN METABOLIC PANEL: CPT

## 2020-09-09 PROCEDURE — 85025 COMPLETE CBC W/AUTO DIFF WBC: CPT

## 2020-09-09 PROCEDURE — 86140 C-REACTIVE PROTEIN: CPT

## 2020-09-09 PROCEDURE — 85651 RBC SED RATE NONAUTOMATED: CPT

## 2020-09-09 PROCEDURE — 36415 COLL VENOUS BLD VENIPUNCTURE: CPT

## 2020-09-11 ENCOUNTER — OFFICE VISIT (OUTPATIENT)
Dept: RHEUMATOLOGY | Facility: CLINIC | Age: 75
End: 2020-09-11
Payer: MEDICARE

## 2020-09-11 VITALS
DIASTOLIC BLOOD PRESSURE: 83 MMHG | HEIGHT: 72 IN | WEIGHT: 186.94 LBS | SYSTOLIC BLOOD PRESSURE: 152 MMHG | TEMPERATURE: 98 F | HEART RATE: 74 BPM | BODY MASS INDEX: 25.32 KG/M2

## 2020-09-11 DIAGNOSIS — M35.00 SJOGREN'S SYNDROME WITHOUT EXTRAGLANDULAR INVOLVEMENT: Chronic | ICD-10-CM

## 2020-09-11 DIAGNOSIS — Z79.631 METHOTREXATE, LONG TERM, CURRENT USE: ICD-10-CM

## 2020-09-11 DIAGNOSIS — Z79.899 LONG-TERM USE OF PLAQUENIL: ICD-10-CM

## 2020-09-11 DIAGNOSIS — M05.79 RHEUMATOID ARTHRITIS INVOLVING MULTIPLE SITES WITH POSITIVE RHEUMATOID FACTOR: Chronic | ICD-10-CM

## 2020-09-11 PROCEDURE — 99214 OFFICE O/P EST MOD 30 MIN: CPT | Mod: PBBFAC | Performed by: INTERNAL MEDICINE

## 2020-09-11 PROCEDURE — 99214 PR OFFICE/OUTPT VISIT, EST, LEVL IV, 30-39 MIN: ICD-10-PCS | Mod: S$PBB,,, | Performed by: INTERNAL MEDICINE

## 2020-09-11 PROCEDURE — 99214 OFFICE O/P EST MOD 30 MIN: CPT | Mod: S$PBB,,, | Performed by: INTERNAL MEDICINE

## 2020-09-11 PROCEDURE — 99999 PR PBB SHADOW E&M-EST. PATIENT-LVL IV: ICD-10-PCS | Mod: PBBFAC,,, | Performed by: INTERNAL MEDICINE

## 2020-09-11 PROCEDURE — 99999 PR PBB SHADOW E&M-EST. PATIENT-LVL IV: CPT | Mod: PBBFAC,,, | Performed by: INTERNAL MEDICINE

## 2020-09-11 NOTE — ASSESSMENT & PLAN NOTE
He has very mild thrombocytopenia likely due to MTX+SSZ but it is not a clinically significant problem    He is due for chloroquine eye exam and he will schedule that    He has no other clinical or lab adverse effects so will cont q3 mo lab while on triple therapy for RA with MTX,SSZ,HCQ

## 2020-09-11 NOTE — ASSESSMENT & PLAN NOTE
Manages dry mouth with cevimeline and denies problems with dry eyes  No hypergammaglobulinemic complications of Sjögren's

## 2020-09-11 NOTE — PROGRESS NOTES
Subjective:       Patient ID: Tom Sutherland is a 75 y.o. male.    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   HENT: Negative for mouth sores.    Respiratory: Negative for cough and shortness of breath.    Cardiovascular: Negative for chest pain.   Gastrointestinal: Negative for diarrhea.   Genitourinary: Negative for dysuria.   Skin: Negative for rash.   Neurological: Negative for headaches.   Psychiatric/Behavioral: Negative for sleep disturbance.         Objective:   BP (!) 152/83 (BP Location: Left arm, Patient Position: Sitting, BP Method: Medium (Automatic))   Pulse 74   Temp 97.5 °F (36.4 °C) (Oral)   Ht 6' (1.829 m)   Wt 84.8 kg (186 lb 15.2 oz)   BMI 25.35 kg/m²      Physical Exam   Constitutional: He is well-developed, well-nourished, and in no distress.   Eyes: No scleral icterus.   Cardiovascular: Normal rate and regular rhythm.    Pulmonary/Chest: He has no wheezes. He has no rales.   Abdominal: There is no abdominal tenderness.   Lymphadenopathy:     He has no cervical adenopathy.   Skin: No rash noted.           12/16/2019 9/11/2020   LOVE-28 (ESR) 2.36 (Remission) 1.39 (Remission)   LOVE-28 (CRP) 2.01 (Remission) 1.42 (Remission)   Tender (LOVE-28) 0 / 28  0 / 28    Swollen (LOVE-28) 0 / 28  0 / 28    Provider Global 1 mm 5 mm   Patient Global 0 mm 10 mm   ESR 29 mm/hr 6 mm/hr   CRP 17.7 mg/L 1.4 mg/L     Lab Results   Component Value Date    SEDRATE 6 09/09/2020          Assessment:       1.  Rheumatoid arthritis involving multiple sites with positive rheumatoid factor    2. Methotrexate, long term, current use    3. Sjogren's syndrome without extraglandular involvement    4. Long-term use of Plaquenil            Plan:       Problem List Items Addressed This Visit        Active Problems    Rheumatoid arthritis involving multiple sites with positive rheumatoid factor (Chronic)     RA remains in remission on triple therapy without clinical adverse events  He will cont current meds and see me in f/u in 6 mo with continued q3m lab for medication monitoring         Sjogren's syndrome (Chronic)     Manages dry mouth with cevimeline and denies problems with dry eyes  No hypergammaglobulinemic complications of Sjögren's          Methotrexate, long term, current use     He has very mild thrombocytopenia likely due to MTX+SSZ but it is not a clinically significant problem    He is due for chloroquine eye exam and he will schedule that    He has no other clinical or lab adverse effects so will cont q3 mo lab while on triple therapy for RA with MTX,SSZ,HCQ         Long-term use of Plaquenil     He has been on HCQ for 9 years and will schedule f/u eye exam for OCT

## 2020-09-11 NOTE — ASSESSMENT & PLAN NOTE
RA remains in remission on triple therapy without clinical adverse events  He will cont current meds and see me in f/u in 6 mo with continued q3m lab for medication monitoring

## 2020-09-14 ENCOUNTER — PATIENT OUTREACH (OUTPATIENT)
Dept: OTHER | Facility: OTHER | Age: 75
End: 2020-09-14

## 2020-09-18 ENCOUNTER — PATIENT OUTREACH (OUTPATIENT)
Dept: OTHER | Facility: OTHER | Age: 75
End: 2020-09-18

## 2020-09-18 NOTE — PROGRESS NOTES
Digital Medicine: Clinician Follow-Up    Patient reports doing well without concern.  Confirms taking the monitor to recent visit to compare blood pressures.  He and the provider concluded that the monitor is reasonably accurate.    He would like to start walking more to help with blood pressures.    The history is provided by the patient.   Follow-up reason(s): routine follow up.     Hypertension    Readings are trending down Patient is not experiencing signs/symptoms of hypertension.          Last 5 Patient Entered Readings                                      Current 30 Day Average: 152/86     Recent Readings 9/18/2020 9/17/2020 9/15/2020 9/14/2020 9/13/2020    SBP (mmHg) 149 134 133 144 157    DBP (mmHg) 91 80 83 85 82    Pulse 71 84 80 72 75               Screenings    ASSESSMENT(S)  Patients BP average is 152/86 mmHg, which is above goal. Patient's BP goal is less than or equal to 130/80 per 2017 ACC/AHA Hypertension Guidelines.     Hypertension Plan  Continue current therapy. Monitor accuracy confirmed.  Defer changes for now to allow patient to work on lifestyle and gain trust in us.  Medication changes likely needed.       Addressed patient questions and patient has my contact information if needed prior to next outreach. Patient verbalizes understanding.                Topic    Lipid (Cholesterol) Test      There are no preventive care reminders to display for this patient.    Hypertension Medications             hydroCHLOROthiazide (HYDRODIURIL) 25 MG tablet Take 1 tablet (25 mg total) by mouth once daily.    losartan (COZAAR) 100 MG tablet Take 1 tablet (100 mg total) by mouth once daily.

## 2020-10-14 ENCOUNTER — PATIENT OUTREACH (OUTPATIENT)
Dept: OTHER | Facility: OTHER | Age: 75
End: 2020-10-14

## 2020-10-17 DIAGNOSIS — M35.01 SJOGREN'S SYNDROME WITH KERATOCONJUNCTIVITIS SICCA: ICD-10-CM

## 2020-10-19 ENCOUNTER — PATIENT MESSAGE (OUTPATIENT)
Dept: OPTOMETRY | Facility: CLINIC | Age: 75
End: 2020-10-19

## 2020-10-19 ENCOUNTER — TELEPHONE (OUTPATIENT)
Dept: OPTOMETRY | Facility: CLINIC | Age: 75
End: 2020-10-19

## 2020-10-19 RX ORDER — CEVIMELINE HYDROCHLORIDE 30 MG/1
30 CAPSULE ORAL 3 TIMES DAILY
Qty: 180 CAPSULE | Refills: 1 | Status: SHIPPED | OUTPATIENT
Start: 2020-10-19 | End: 2020-10-29 | Stop reason: SDUPTHER

## 2020-10-19 NOTE — TELEPHONE ENCOUNTER
Returned patient call for sooner appt. Patient is schedule for 11/24 @ 2:30 which is the soonest. No ans LVM

## 2020-10-28 ENCOUNTER — PATIENT MESSAGE (OUTPATIENT)
Dept: RHEUMATOLOGY | Facility: CLINIC | Age: 75
End: 2020-10-28

## 2020-10-28 DIAGNOSIS — M35.01 SJOGREN'S SYNDROME WITH KERATOCONJUNCTIVITIS SICCA: ICD-10-CM

## 2020-10-29 RX ORDER — CEVIMELINE HYDROCHLORIDE 30 MG/1
30 CAPSULE ORAL 3 TIMES DAILY
Qty: 180 CAPSULE | Refills: 1 | Status: SHIPPED | OUTPATIENT
Start: 2020-10-29 | End: 2021-03-02 | Stop reason: SDUPTHER

## 2020-11-02 ENCOUNTER — PATIENT MESSAGE (OUTPATIENT)
Dept: OPTOMETRY | Facility: CLINIC | Age: 75
End: 2020-11-02

## 2020-11-02 NOTE — PROGRESS NOTES
Digital Medicine: Health  Follow-Up    The history is provided by the patient.             Reason for review: Blood pressure not at goal      Additional Follow-up details: Patient states that            Diet-Not assessed          Physical Activity-no change to routine  No change to exercise routine.       Additional physical activity details: Patient walks around the house and vacuumed today. He states that he is still contemplating a gym membership, but expresses concerns about exposure to the public and covid. Encouraged short walks around the block, but no immediate plans to do so.       Medication Adherence-Medication Adherence not addressed.      Substance, Sleep, Stress-Not assessed      Continue current diet/physical activity routine. Continue to follow up on physical activity       Addressed patient questions and patient has my contact information if needed prior to next outreach.   Explained the importance of self-monitoring and medication adherence. Encouraged the patient to communicate with their health  for lifestyle modifications to help improve or maintain a healthy lifestyle.                   Topic    Lipid (Cholesterol) Test          Last 5 Patient Entered Readings                                      Current 30 Day Average: 144/83     Recent Readings 11/2/2020 11/1/2020 11/1/2020 10/31/2020 10/29/2020    SBP (mmHg) 139 154 164 144 141    DBP (mmHg) 80 84 94 80 84    Pulse 76 71 67 64 75

## 2020-11-04 ENCOUNTER — OFFICE VISIT (OUTPATIENT)
Dept: OPTOMETRY | Facility: CLINIC | Age: 75
End: 2020-11-04
Payer: MEDICARE

## 2020-11-04 DIAGNOSIS — H52.4 PRESBYOPIA OF BOTH EYES: ICD-10-CM

## 2020-11-04 DIAGNOSIS — H52.223 REGULAR ASTIGMATISM OF BOTH EYES: ICD-10-CM

## 2020-11-04 DIAGNOSIS — M05.79 RHEUMATOID ARTHRITIS INVOLVING MULTIPLE SITES WITH POSITIVE RHEUMATOID FACTOR: Primary | ICD-10-CM

## 2020-11-04 DIAGNOSIS — Z79.899 LONG-TERM USE OF PLAQUENIL: ICD-10-CM

## 2020-11-04 DIAGNOSIS — Z79.899 ENCOUNTER FOR EYE EXAM DUE TO HIGH RISK MEDICATION: ICD-10-CM

## 2020-11-04 DIAGNOSIS — H25.13 NUCLEAR SCLEROSIS OF BOTH EYES: ICD-10-CM

## 2020-11-04 PROCEDURE — 99213 OFFICE O/P EST LOW 20 MIN: CPT | Mod: PBBFAC | Performed by: OPTOMETRIST

## 2020-11-04 PROCEDURE — 92134 CPTRZ OPH DX IMG PST SGM RTA: CPT | Mod: PBBFAC | Performed by: OPTOMETRIST

## 2020-11-04 PROCEDURE — 99999 PR PBB SHADOW E&M-EST. PATIENT-LVL III: ICD-10-PCS | Mod: PBBFAC,,, | Performed by: OPTOMETRIST

## 2020-11-04 PROCEDURE — 92015 DETERMINE REFRACTIVE STATE: CPT | Mod: ,,, | Performed by: OPTOMETRIST

## 2020-11-04 PROCEDURE — 92014 COMPRE OPH EXAM EST PT 1/>: CPT | Mod: S$PBB,,, | Performed by: OPTOMETRIST

## 2020-11-04 PROCEDURE — 92015 PR REFRACTION: ICD-10-PCS | Mod: ,,, | Performed by: OPTOMETRIST

## 2020-11-04 PROCEDURE — 92014 PR EYE EXAM, EST PATIENT,COMPREHESV: ICD-10-PCS | Mod: S$PBB,,, | Performed by: OPTOMETRIST

## 2020-11-04 PROCEDURE — 99999 PR PBB SHADOW E&M-EST. PATIENT-LVL III: CPT | Mod: PBBFAC,,, | Performed by: OPTOMETRIST

## 2020-11-04 PROCEDURE — 92134 OCT, RETINA - OU - BOTH EYES: ICD-10-PCS | Mod: 26,S$PBB,, | Performed by: OPTOMETRIST

## 2020-11-04 NOTE — PATIENT INSTRUCTIONS
History of rheumatoid arthritis  History of Plaquenil use x 5 years.  No evidence of Plaquenil toxicity noted in either eye.  Color vision normal in each eye.   Repeat OCT of retina at macula done today.  No evidence of abnormality of foveal contour on OCT in either eye.  No evidence of pigmentary changes at macula/posterior pole secondary to Plaquenil use in either on previous visit.     Due for repeat Wylie Visual Field (HVF) test (10-2)  Assistant to call to schedule repeat HVF test, and follow-up visit with me (Dr. Dominguez) on the same date         Nuclear sclerosis of lens of both eyes, consistent with age.  No need for cataract surgery in either eye.  Astigmatic refractive error in each eye, with good best-corrected VA in each eye.  Presbyopia consistent with age.  New spectacle lens Rx issued for use as desired.    Repeat refraction in one year

## 2020-11-04 NOTE — PROGRESS NOTES
HPI     Annual Exam      Additional comments: Plaquenil check.  Rheumatoid arthritis.  Has taken 400 mg Plaquenil x 8 years +/-.  No acute ocular/vision problems.  Wear glasses full-time                Comments     Patient's age: 75 y.o. 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 yes     If yes, wears  Full-time or part-time?  Full-time  Present glasses are: Bifocal, SV Distance, SV Reading?  Bifocals lens   Approximate age of present glasses:  2+ years old  Got new glasses following last exam, or subsequently?:  yes   Any problem with VA with glasses?   No problems  Wears CLs?:  no  Headaches?  no  Eye pain/discomfort?  no                                                                                     Flashes?  no  Floaters?  No   Diplopia/Double vision?  no  Patient's Ocular History:         Any eye surgeries? no         Any eye injury?  no         Any treatment for eye disease?  No -  takes Plaquenil, and   Methotrexate.  Family history of eye disease?  no  Significant patient medical history:         1. Diabetes?  no       If yes, IDDM or NIDDM? na   2. HBP?  Yes, controlled with meds               3. Other (describe):  RA   ! OTC eyedrops currently using:  no   ! Prescription eye meds currently using:  Patanol prn   ! Any history of allergy/adverse reaction to any eye meds used   previously?  no    ! Any history of allergy/adverse reaction to eyedrops used during prior   eye exam(s)? no    ! Any history of allergy/adverse reaction to Novacaine or similar meds?   no   ! Any history of allergy/adverse reaction to Epinephrine or similar meds?   no    ! Patient okay with use of anesthetic eyedrops to check eye pressure?    yes        ! Patient okay with use of eyedrops to dilate pupils today?  yes   !  Allergies/Medications/Medical History/Family History reviewed today?   "  yes      PD =   64/60  Desired reading distance =  19.5"                                                                   Last edited by Dudley Dominguez, OD on 11/4/2020  3:26 PM. (History)            Assessment /Plan     For exam results, see Encounter Report.    1. Rheumatoid arthritis involving multiple sites with positive rheumatoid factor  OCT, Retina - OU - Both Eyes    Wylie Visual Field - OU - Extended - Both Eyes   2. Long-term use of Plaquenil  OCT, Retina - OU - Both Eyes    Wylie Visual Field - OU - Extended - Both Eyes   3. Encounter for eye exam due to high risk medication     4. Nuclear sclerosis of both eyes     5. Regular astigmatism of both eyes     6. Presbyopia of both eyes                   History of rheumatoid arthritis  History of Plaquenil use x 5 years.  No evidence of Plaquenil toxicity noted in either eye.  Color vision normal in each eye.   Repeat OCT of retina at macula done today.  No evidence of abnormality of foveal contour on OCT in either eye.  No evidence of pigmentary changes at macula/posterior pole secondary to Plaquenil use in either on previous visit.     Due for repeat Wylie Visual Field (HVF) test (10-2)  Assistant to call to schedule repeat HVF test, and follow-up visit with me (Dr. Dominguez) on the same date         Nuclear sclerosis of lens of both eyes, consistent with age.  No need for cataract surgery in either eye.  Astigmatic refractive error in each eye, with good best-corrected VA in each eye.  Presbyopia consistent with age.  New spectacle lens Rx issued for use as desired.    Repeat refraction in one year         "

## 2020-11-18 ENCOUNTER — PATIENT OUTREACH (OUTPATIENT)
Dept: OTHER | Facility: OTHER | Age: 75
End: 2020-11-18

## 2020-11-19 NOTE — PROGRESS NOTES
Digital Medicine: Clinician Follow-Up    Patient reports doing well without concern.  Reports a busy week with wife's appointments this week.  She is doing well and they received positive news about her cancer.  Notes high readings a few weeks ago and this it may have related to the election.  Also notes fluctuating readings in general and does not know why - doesn't think he is doing anything to cause these.    Reports monitor battery does not last very long - maybe a day or so off .  Reports staying active with different things - walking, taking part in some local clean-ups, chores around the house.    The history is provided by the patient.   Follow-up reason(s): routine follow up.     Hypertension    Readings are trending down Patient is not experiencing signs/symptoms of hypertension.            Last 5 Patient Entered Readings                                      Current 30 Day Average: 146/86     Recent Readings 11/16/2020 11/14/2020 11/10/2020 11/9/2020 11/8/2020    SBP (mmHg) 151 127 146 136 150    DBP (mmHg) 84 69 79 91 81    Pulse 81 88 80 80 78                 Depression Screening  Did not address depression screening.    Sleep Apnea Screening    Did not address sleep apnea screening.     Medication Affordability Screening  Did not address medication affordability screening.     Medication Adherence-Medication adherence was assessed.          ASSESSMENT(S)  Patients BP average is 146/86 mmHg, which is above goal. Patient's BP goal is less than or equal to 130/80.     Hypertension Plan  Additional monitoring needed. He will start taking readings only between [id number] to see if they are still fluctuating without reason.  Due to battery issue, may need replaced.  Continue current therapy. Defer changes for now while investigating monitor.  Instructed to charge device.       Addressed patient questions and patient has my contact information if needed prior to next outreach. Patient verbalizes  understanding.                 Topic    Lipid (Cholesterol) Test      There are no preventive care reminders to display for this patient.      Hypertension Medications             hydroCHLOROthiazide (HYDRODIURIL) 25 MG tablet Take 1 tablet (25 mg total) by mouth once daily.    losartan (COZAAR) 100 MG tablet Take 1 tablet (100 mg total) by mouth once daily.

## 2020-11-24 ENCOUNTER — PATIENT OUTREACH (OUTPATIENT)
Dept: OTHER | Facility: OTHER | Age: 75
End: 2020-11-24

## 2020-12-03 ENCOUNTER — PATIENT OUTREACH (OUTPATIENT)
Dept: OTHER | Facility: OTHER | Age: 75
End: 2020-12-03

## 2020-12-03 NOTE — PROGRESS NOTES
Digital Medicine: Clinician Follow-Up    Patient reports doing well without concern.  Unable to speak for long amount of time.  Reports that remember to take the readings between 10-11am was difficult so he start taking the readings a little earlier.      The history is provided by the patient.   Follow-up reason(s): routine follow up.     Hypertension    Patient's blood pressure readings are labile. Patient is not experiencing signs/symptoms of hypertension.            Last 5 Patient Entered Readings                                      Current 30 Day Average: 148/88     Recent Readings 12/3/2020 12/2/2020 11/30/2020 11/29/2020 11/29/2020    SBP (mmHg) 152 151 158 143 171    DBP (mmHg) 94 91 87 88 87    Pulse 80 84 79 69 72                 Depression Screening  Did not address depression screening.    Sleep Apnea Screening    Did not address sleep apnea screening.     Medication Affordability Screening  Did not address medication affordability screening.     Medication Adherence-Medication Adherence not addressed.          ASSESSMENT(S)  Patients BP average is 148/88 mmHg, which is above goal. Patient's BP goal is less than or equal to 130/80.     Hypertension Plan  Additional monitoring needed. He will go to [facility] to discuss monitor battery issue on 12/8.  Continue current therapy. Defer for now.  Even with monitor issue, likely does need medication optimization.  First option would be switching to more potent ARB.       Addressed patient questions and patient has my contact information if needed prior to next outreach. Patient verbalizes understanding.                 Topic    Lipid (Cholesterol) Test      There are no preventive care reminders to display for this patient.      Hypertension Medications             hydroCHLOROthiazide (HYDRODIURIL) 25 MG tablet Take 1 tablet (25 mg total) by mouth once daily.    losartan (COZAAR) 100 MG tablet Take 1 tablet (100 mg total) by mouth once daily.

## 2020-12-07 ENCOUNTER — PATIENT MESSAGE (OUTPATIENT)
Dept: RHEUMATOLOGY | Facility: CLINIC | Age: 75
End: 2020-12-07

## 2020-12-07 DIAGNOSIS — M05.79 RHEUMATOID ARTHRITIS INVOLVING MULTIPLE SITES WITH POSITIVE RHEUMATOID FACTOR: ICD-10-CM

## 2020-12-07 RX ORDER — SULFASALAZINE 500 MG/1
1000 TABLET, DELAYED RELEASE ORAL 2 TIMES DAILY
Qty: 360 TABLET | Refills: 3 | Status: SHIPPED | OUTPATIENT
Start: 2020-12-07 | End: 2020-12-08 | Stop reason: SDUPTHER

## 2020-12-08 ENCOUNTER — OFFICE VISIT (OUTPATIENT)
Dept: OPTOMETRY | Facility: CLINIC | Age: 75
End: 2020-12-08
Payer: MEDICARE

## 2020-12-08 ENCOUNTER — PATIENT MESSAGE (OUTPATIENT)
Dept: ADMINISTRATIVE | Facility: OTHER | Age: 75
End: 2020-12-08

## 2020-12-08 ENCOUNTER — CLINICAL SUPPORT (OUTPATIENT)
Dept: OPHTHALMOLOGY | Facility: CLINIC | Age: 75
End: 2020-12-08
Payer: MEDICARE

## 2020-12-08 DIAGNOSIS — H25.13 NUCLEAR SCLEROSIS OF BOTH EYES: ICD-10-CM

## 2020-12-08 DIAGNOSIS — Z79.899 LONG-TERM USE OF PLAQUENIL: ICD-10-CM

## 2020-12-08 DIAGNOSIS — M05.79 RHEUMATOID ARTHRITIS INVOLVING MULTIPLE SITES WITH POSITIVE RHEUMATOID FACTOR: Primary | ICD-10-CM

## 2020-12-08 DIAGNOSIS — M05.79 RHEUMATOID ARTHRITIS INVOLVING MULTIPLE SITES WITH POSITIVE RHEUMATOID FACTOR: ICD-10-CM

## 2020-12-08 PROCEDURE — 99999 PR PBB SHADOW E&M-EST. PATIENT-LVL III: CPT | Mod: PBBFAC,,, | Performed by: OPTOMETRIST

## 2020-12-08 PROCEDURE — 99499 NO LOS: ICD-10-PCS | Mod: S$PBB,,, | Performed by: OPTOMETRIST

## 2020-12-08 PROCEDURE — 99499 UNLISTED E&M SERVICE: CPT | Mod: S$PBB,,, | Performed by: OPTOMETRIST

## 2020-12-08 PROCEDURE — 99999 PR PBB SHADOW E&M-EST. PATIENT-LVL III: ICD-10-PCS | Mod: PBBFAC,,, | Performed by: OPTOMETRIST

## 2020-12-08 PROCEDURE — 99213 OFFICE O/P EST LOW 20 MIN: CPT | Mod: PBBFAC | Performed by: OPTOMETRIST

## 2020-12-08 NOTE — PROGRESS NOTES
Visual Field Completed. 10-2 Plaqu    Reliability & Fix: Good  Cooperation: Good     Pt stated no Allergies to Latex or Adhesives. Used Coverlet Patch.    Glasses Prescription     Sphere Cylinder Axis Dist [facility] Add   Right -1.25 +2.00 013 20/20-3 +2.25   Left Allen +1.25 178 20/20 (-) +2.25   Comments: ST bifocal, OR single vision distance lenses and/or single vision reading lenses.   ST trifocal okay, if desired.       If progressives, +2.50 D add OU            GM

## 2020-12-08 NOTE — PROGRESS NOTES
HPI     HVF      Additional comments: No problems              Comments     Patient in for progress check.    Pt returns for review:     Being followed for (diagnosis):   Plaquenil check     Date last seen:  11/04/2020    Doctor last seen:      Prescribed eye medications(s) using:  None     OTC eye medication(s) using:  None     Signs/symptoms of condition resolved/better/stable/worse?:                    Last edited by Maurilio Shipley MA on 12/8/2020 11:46 AM. (History)            Assessment /Plan     For exam results, see Encounter Report.    1. Rheumatoid arthritis involving multiple sites with positive rheumatoid factor     2. Long-term use of Plaquenil     3. Nuclear sclerosis of both eyes                  Prior finding of bilateral nuclear sclerosis.     History of rheumatoid arthritis  History of Plaquenil use x 5 years.  No evidence of Plaquenil toxicity noted in either eye on last visit (11/04/2020)  Color vision was  normal in each eye.   Repeat OCT of retina at macula done on that date, without evidence of abnormality of foveal contour on OCT in either eye.  No evidence of pigmentary changes at macula/posterior pole secondary to Plaquenil use in either on previous visit.    HVF 10-2 done today.  Results normal in both eyes.  No evidence of Plaquenil toxicity.  Return in six months per Plaquenil protocol - or one year, if approved by rheumatologist.

## 2020-12-09 RX ORDER — SULFASALAZINE 500 MG/1
1000 TABLET, DELAYED RELEASE ORAL 2 TIMES DAILY
Qty: 360 TABLET | Refills: 3 | Status: SHIPPED | OUTPATIENT
Start: 2020-12-09 | End: 2020-12-10 | Stop reason: SDUPTHER

## 2020-12-10 DIAGNOSIS — M05.79 RHEUMATOID ARTHRITIS INVOLVING MULTIPLE SITES WITH POSITIVE RHEUMATOID FACTOR: ICD-10-CM

## 2020-12-10 RX ORDER — SULFASALAZINE 500 MG/1
1000 TABLET, DELAYED RELEASE ORAL 2 TIMES DAILY
Qty: 360 TABLET | Refills: 3 | Status: SHIPPED | OUTPATIENT
Start: 2020-12-10 | End: 2021-12-01 | Stop reason: SDUPTHER

## 2020-12-11 NOTE — PROGRESS NOTES
Digital Medicine: Health  Follow-Up    The history is provided by the patient.             Reason for review: Blood pressure not at goal      Additional Follow-up details: Asked patient about going to the [facility]- he has not had the chance to visit yet. Provided instructions about how to access the main campus [facility] location.             Diet-Not assessed          Physical Activity-Change      Additional physical activity details: Patient reports that he has been considering a gym membership for a while, and he is thinking about getting this as a sapna present for both himself and his wife. Congratulated him on taking this step, and will continue to follow up on activity routine.       Medication Adherence-Medication Adherence not addressed.      Substance, Sleep, Stress-Not assessed      Continue current diet/physical activity routine.       Addressed patient questions and patient has my contact information if needed prior to next outreach.   Explained the importance of self-monitoring and medication adherence. Encouraged the patient to communicate with their health  for lifestyle modifications to help improve or maintain a healthy lifestyle.                   Topic    Lipid (Cholesterol) Test          Last 5 Patient Entered Readings                                      Current 30 Day Average: 148/87     Recent Readings 12/7/2020 12/6/2020 12/3/2020 12/2/2020 11/30/2020    SBP (mmHg) 162 147 152 151 158    DBP (mmHg) 89 80 94 91 87    Pulse 78 79 80 84 79

## 2020-12-15 ENCOUNTER — PATIENT OUTREACH (OUTPATIENT)
Dept: OTHER | Facility: OTHER | Age: 75
End: 2020-12-15

## 2020-12-15 NOTE — PROGRESS NOTES
Digital Medicine: Clinician Follow-Up    Patient reports doing well without concern.  Another busy week of appointments for his wife.      The history is provided by the patient.   Follow-up reason(s): routine follow up.     Hypertension    Readings are trending down Patient is not experiencing signs/symptoms of hypertension.            Last 5 Patient Entered Readings                                      Current 30 Day Average: 149/88     Recent Readings 12/14/2020 12/7/2020 12/6/2020 12/3/2020 12/2/2020    SBP (mmHg) 140 162 147 152 151    DBP (mmHg) 86 89 80 94 91    Pulse 80 78 79 80 84                 Depression Screening  Did not address depression screening.    Sleep Apnea Screening    Did not address sleep apnea screening.     Medication Affordability Screening  Did not address medication affordability screening.     Medication Adherence-Medication Adherence not addressed.          ASSESSMENT(S)  Patients BP average is 149/88 mmHg, which is above goal. Patient's BP goal is less than or equal to 130/80.     Hypertension Plan  Continue current therapy. Patient elected to go on a month hiatus to focus on wife.  We will discuss potential changes to regimen based on readings taken after hiatus.  Await MD intervention. Will reach out to Dr. Osorio to get his thoughts and clarify BP goal.               Topic    Lipid (Cholesterol) Test      There are no preventive care reminders to display for this patient.      Hypertension Medications             hydroCHLOROthiazide (HYDRODIURIL) 25 MG tablet Take 1 tablet (25 mg total) by mouth once daily.    losartan (COZAAR) 100 MG tablet Take 1 tablet (100 mg total) by mouth once daily.

## 2020-12-31 ENCOUNTER — PATIENT MESSAGE (OUTPATIENT)
Dept: CARDIOLOGY | Facility: CLINIC | Age: 75
End: 2020-12-31

## 2021-01-09 ENCOUNTER — IMMUNIZATION (OUTPATIENT)
Dept: INTERNAL MEDICINE | Facility: CLINIC | Age: 76
End: 2021-01-09
Payer: MEDICARE

## 2021-01-09 DIAGNOSIS — Z23 NEED FOR VACCINATION: ICD-10-CM

## 2021-01-09 PROCEDURE — 91300 COVID-19, MRNA, LNP-S, PF, 30 MCG/0.3 ML DOSE VACCINE: CPT | Mod: PBBFAC

## 2021-01-30 ENCOUNTER — IMMUNIZATION (OUTPATIENT)
Dept: INTERNAL MEDICINE | Facility: CLINIC | Age: 76
End: 2021-01-30
Payer: MEDICARE

## 2021-01-30 DIAGNOSIS — Z23 NEED FOR VACCINATION: Primary | ICD-10-CM

## 2021-01-30 PROCEDURE — 91300 COVID-19, MRNA, LNP-S, PF, 30 MCG/0.3 ML DOSE VACCINE: CPT | Mod: PBBFAC | Performed by: INTERNAL MEDICINE

## 2021-01-30 PROCEDURE — 0002A COVID-19, MRNA, LNP-S, PF, 30 MCG/0.3 ML DOSE VACCINE: CPT | Mod: PBBFAC | Performed by: INTERNAL MEDICINE

## 2021-02-23 ENCOUNTER — PATIENT MESSAGE (OUTPATIENT)
Dept: RHEUMATOLOGY | Facility: CLINIC | Age: 76
End: 2021-02-23

## 2021-03-02 DIAGNOSIS — M35.01 SJOGREN'S SYNDROME WITH KERATOCONJUNCTIVITIS SICCA: ICD-10-CM

## 2021-03-04 RX ORDER — CEVIMELINE HYDROCHLORIDE 30 MG/1
30 CAPSULE ORAL 3 TIMES DAILY
Qty: 180 CAPSULE | Refills: 1 | Status: SHIPPED | OUTPATIENT
Start: 2021-03-04 | End: 2021-03-04

## 2021-03-04 RX ORDER — CEVIMELINE HYDROCHLORIDE 30 MG/1
30 CAPSULE ORAL 3 TIMES DAILY
Qty: 180 CAPSULE | Refills: 1 | Status: SHIPPED | OUTPATIENT
Start: 2021-03-04 | End: 2021-05-24

## 2021-03-18 ENCOUNTER — PATIENT MESSAGE (OUTPATIENT)
Dept: RESEARCH | Facility: HOSPITAL | Age: 76
End: 2021-03-18

## 2021-03-26 ENCOUNTER — PATIENT MESSAGE (OUTPATIENT)
Dept: RESEARCH | Facility: HOSPITAL | Age: 76
End: 2021-03-26

## 2021-04-27 ENCOUNTER — PATIENT MESSAGE (OUTPATIENT)
Dept: OPTOMETRY | Facility: CLINIC | Age: 76
End: 2021-04-27

## 2021-04-27 ENCOUNTER — PATIENT MESSAGE (OUTPATIENT)
Dept: RHEUMATOLOGY | Facility: CLINIC | Age: 76
End: 2021-04-27

## 2021-05-28 NOTE — PATIENT INSTRUCTIONS
Prior finding of bilateral nuclear sclerosis.     History of rheumatoid arthritis  History of Plaquenil use x 5 years.  No evidence of Plaquenil toxicity noted in either eye on last visit (11/04/2020)  Color vision was  normal in each eye.   Repeat OCT of retina at macula done on that date, without evidence of abnormality of foveal contour on OCT in either eye.  No evidence of pigmentary changes at macula/posterior pole secondary to Plaquenil use in either on previous visit.    HVF 10-2 done today.  Results normal in both eyes.  No evidence of Plaquenil toxicity.  Return in six months per Plaquenil protocol - or one year, if approved by rheumatologist.    No

## 2021-06-06 ENCOUNTER — PATIENT MESSAGE (OUTPATIENT)
Dept: RHEUMATOLOGY | Facility: CLINIC | Age: 76
End: 2021-06-06

## 2021-06-14 DIAGNOSIS — I10 ESSENTIAL HYPERTENSION: ICD-10-CM

## 2021-06-14 RX ORDER — LOSARTAN POTASSIUM 100 MG/1
100 TABLET ORAL DAILY
Qty: 90 TABLET | Refills: 1 | Status: SHIPPED | OUTPATIENT
Start: 2021-06-14 | End: 2021-11-17 | Stop reason: ALTCHOICE

## 2021-06-23 ENCOUNTER — LAB VISIT (OUTPATIENT)
Dept: LAB | Facility: OTHER | Age: 76
End: 2021-06-23
Attending: INTERNAL MEDICINE
Payer: MEDICARE

## 2021-06-23 ENCOUNTER — PATIENT MESSAGE (OUTPATIENT)
Dept: RHEUMATOLOGY | Facility: CLINIC | Age: 76
End: 2021-06-23

## 2021-06-23 DIAGNOSIS — M05.79 RHEUMATOID ARTHRITIS INVOLVING MULTIPLE SITES WITH POSITIVE RHEUMATOID FACTOR: Chronic | ICD-10-CM

## 2021-06-23 LAB
ALBUMIN SERPL BCP-MCNC: 3.9 G/DL (ref 3.5–5.2)
ALP SERPL-CCNC: 70 U/L (ref 55–135)
ALT SERPL W/O P-5'-P-CCNC: 17 U/L (ref 10–44)
ANION GAP SERPL CALC-SCNC: 8 MMOL/L (ref 8–16)
AST SERPL-CCNC: 31 U/L (ref 10–40)
BASOPHILS # BLD AUTO: 0.02 K/UL (ref 0–0.2)
BASOPHILS NFR BLD: 0.4 % (ref 0–1.9)
BILIRUB SERPL-MCNC: 0.7 MG/DL (ref 0.1–1)
BUN SERPL-MCNC: 23 MG/DL (ref 8–23)
CALCIUM SERPL-MCNC: 9 MG/DL (ref 8.7–10.5)
CHLORIDE SERPL-SCNC: 103 MMOL/L (ref 95–110)
CO2 SERPL-SCNC: 26 MMOL/L (ref 23–29)
CREAT SERPL-MCNC: 1 MG/DL (ref 0.5–1.4)
CRP SERPL-MCNC: 3.7 MG/L (ref 0–8.2)
DIFFERENTIAL METHOD: ABNORMAL
EOSINOPHIL # BLD AUTO: 0.3 K/UL (ref 0–0.5)
EOSINOPHIL NFR BLD: 6.3 % (ref 0–8)
ERYTHROCYTE [DISTWIDTH] IN BLOOD BY AUTOMATED COUNT: 12.7 % (ref 11.5–14.5)
ERYTHROCYTE [SEDIMENTATION RATE] IN BLOOD: 15 MM/HR (ref 0–10)
EST. GFR  (AFRICAN AMERICAN): >60 ML/MIN/1.73 M^2
EST. GFR  (NON AFRICAN AMERICAN): >60 ML/MIN/1.73 M^2
GLUCOSE SERPL-MCNC: 103 MG/DL (ref 70–110)
HCT VFR BLD AUTO: 43.4 % (ref 40–54)
HGB BLD-MCNC: 14.4 G/DL (ref 14–18)
IMM GRANULOCYTES # BLD AUTO: 0.02 K/UL (ref 0–0.04)
IMM GRANULOCYTES NFR BLD AUTO: 0.4 % (ref 0–0.5)
LYMPHOCYTES # BLD AUTO: 0.9 K/UL (ref 1–4.8)
LYMPHOCYTES NFR BLD: 17.7 % (ref 18–48)
MCH RBC QN AUTO: 32.6 PG (ref 27–31)
MCHC RBC AUTO-ENTMCNC: 33.2 G/DL (ref 32–36)
MCV RBC AUTO: 98 FL (ref 82–98)
MONOCYTES # BLD AUTO: 0.5 K/UL (ref 0.3–1)
MONOCYTES NFR BLD: 10.6 % (ref 4–15)
NEUTROPHILS # BLD AUTO: 3.2 K/UL (ref 1.8–7.7)
NEUTROPHILS NFR BLD: 64.6 % (ref 38–73)
NRBC BLD-RTO: 0 /100 WBC
PLATELET # BLD AUTO: 121 K/UL (ref 150–450)
PMV BLD AUTO: 9.9 FL (ref 9.2–12.9)
POTASSIUM SERPL-SCNC: 4 MMOL/L (ref 3.5–5.1)
PROT SERPL-MCNC: 7.4 G/DL (ref 6–8.4)
RBC # BLD AUTO: 4.42 M/UL (ref 4.6–6.2)
SODIUM SERPL-SCNC: 137 MMOL/L (ref 136–145)
WBC # BLD AUTO: 4.91 K/UL (ref 3.9–12.7)

## 2021-06-23 PROCEDURE — 85651 RBC SED RATE NONAUTOMATED: CPT | Performed by: INTERNAL MEDICINE

## 2021-06-23 PROCEDURE — 36415 COLL VENOUS BLD VENIPUNCTURE: CPT | Performed by: INTERNAL MEDICINE

## 2021-06-23 PROCEDURE — 80053 COMPREHEN METABOLIC PANEL: CPT | Performed by: INTERNAL MEDICINE

## 2021-06-23 PROCEDURE — 85025 COMPLETE CBC W/AUTO DIFF WBC: CPT | Performed by: INTERNAL MEDICINE

## 2021-06-23 PROCEDURE — 86140 C-REACTIVE PROTEIN: CPT | Performed by: INTERNAL MEDICINE

## 2021-06-28 ENCOUNTER — OFFICE VISIT (OUTPATIENT)
Dept: RHEUMATOLOGY | Facility: CLINIC | Age: 76
End: 2021-06-28
Payer: MEDICARE

## 2021-06-28 VITALS
HEIGHT: 71 IN | WEIGHT: 188.25 LBS | HEART RATE: 69 BPM | DIASTOLIC BLOOD PRESSURE: 88 MMHG | BODY MASS INDEX: 26.35 KG/M2 | SYSTOLIC BLOOD PRESSURE: 144 MMHG

## 2021-06-28 DIAGNOSIS — M05.79 RHEUMATOID ARTHRITIS INVOLVING MULTIPLE SITES WITH POSITIVE RHEUMATOID FACTOR: Primary | ICD-10-CM

## 2021-06-28 DIAGNOSIS — M35.00 SJOGREN'S SYNDROME WITHOUT EXTRAGLANDULAR INVOLVEMENT: Chronic | ICD-10-CM

## 2021-06-28 DIAGNOSIS — Z79.899 LONG-TERM USE OF PLAQUENIL: ICD-10-CM

## 2021-06-28 DIAGNOSIS — Z79.631 METHOTREXATE, LONG TERM, CURRENT USE: ICD-10-CM

## 2021-06-28 PROBLEM — H93.19 SUBJECTIVE TINNITUS: Status: ACTIVE | Noted: 2021-06-28

## 2021-06-28 PROCEDURE — 99214 OFFICE O/P EST MOD 30 MIN: CPT | Mod: PBBFAC | Performed by: INTERNAL MEDICINE

## 2021-06-28 PROCEDURE — 99214 PR OFFICE/OUTPT VISIT, EST, LEVL IV, 30-39 MIN: ICD-10-PCS | Mod: S$PBB,,, | Performed by: INTERNAL MEDICINE

## 2021-06-28 PROCEDURE — 99999 PR PBB SHADOW E&M-EST. PATIENT-LVL IV: ICD-10-PCS | Mod: PBBFAC,,, | Performed by: INTERNAL MEDICINE

## 2021-06-28 PROCEDURE — 99214 OFFICE O/P EST MOD 30 MIN: CPT | Mod: S$PBB,,, | Performed by: INTERNAL MEDICINE

## 2021-06-28 PROCEDURE — 99999 PR PBB SHADOW E&M-EST. PATIENT-LVL IV: CPT | Mod: PBBFAC,,, | Performed by: INTERNAL MEDICINE

## 2021-06-28 RX ORDER — METHOTREXATE 2.5 MG/1
7.5 TABLET ORAL
Qty: 39 TABLET | Refills: 0 | Status: SHIPPED | OUTPATIENT
Start: 2021-06-28 | End: 2021-11-03 | Stop reason: SDUPTHER

## 2021-06-28 RX ORDER — A/SINGAPORE/GP1908/2015 IVR-180 (AN A/MICHIGAN/45/2015 (H1N1)PDM09-LIKE VIRUS, A/HONG KONG/4801/2014, NYMC X-263B (H3N2) (AN A/HONG KONG/4801/2014-LIKE VIRUS), AND B/BRISBANE/60/2008, WILD TYPE (A B/BRISBANE/60/2008-LIKE VIRUS) 15; 15; 15 UG/.5ML; UG/.5ML; UG/.5ML
INJECTION, SUSPENSION INTRAMUSCULAR
COMMUNITY
Start: 2020-10-27 | End: 2022-09-28 | Stop reason: ALTCHOICE

## 2021-06-30 ENCOUNTER — OFFICE VISIT (OUTPATIENT)
Dept: CARDIOLOGY | Facility: CLINIC | Age: 76
End: 2021-06-30
Payer: MEDICARE

## 2021-06-30 ENCOUNTER — PATIENT MESSAGE (OUTPATIENT)
Dept: RESEARCH | Facility: HOSPITAL | Age: 76
End: 2021-06-30

## 2021-06-30 VITALS
WEIGHT: 185.88 LBS | DIASTOLIC BLOOD PRESSURE: 79 MMHG | SYSTOLIC BLOOD PRESSURE: 148 MMHG | HEIGHT: 71 IN | BODY MASS INDEX: 26.02 KG/M2 | HEART RATE: 75 BPM

## 2021-06-30 DIAGNOSIS — Z79.631 METHOTREXATE, LONG TERM, CURRENT USE: ICD-10-CM

## 2021-06-30 DIAGNOSIS — I34.0 NON-RHEUMATIC MITRAL REGURGITATION: Primary | ICD-10-CM

## 2021-06-30 DIAGNOSIS — M05.79 RHEUMATOID ARTHRITIS INVOLVING MULTIPLE SITES WITH POSITIVE RHEUMATOID FACTOR: Chronic | ICD-10-CM

## 2021-06-30 DIAGNOSIS — Z79.899 LONG-TERM USE OF PLAQUENIL: ICD-10-CM

## 2021-06-30 DIAGNOSIS — I10 ESSENTIAL HYPERTENSION: ICD-10-CM

## 2021-06-30 PROCEDURE — 99213 OFFICE O/P EST LOW 20 MIN: CPT | Mod: S$PBB,,, | Performed by: INTERNAL MEDICINE

## 2021-06-30 PROCEDURE — 99999 PR PBB SHADOW E&M-EST. PATIENT-LVL V: ICD-10-PCS | Mod: PBBFAC,,, | Performed by: INTERNAL MEDICINE

## 2021-06-30 PROCEDURE — 99215 OFFICE O/P EST HI 40 MIN: CPT | Mod: PBBFAC | Performed by: INTERNAL MEDICINE

## 2021-06-30 PROCEDURE — 99213 PR OFFICE/OUTPT VISIT, EST, LEVL III, 20-29 MIN: ICD-10-PCS | Mod: S$PBB,,, | Performed by: INTERNAL MEDICINE

## 2021-06-30 PROCEDURE — 99999 PR PBB SHADOW E&M-EST. PATIENT-LVL V: CPT | Mod: PBBFAC,,, | Performed by: INTERNAL MEDICINE

## 2021-06-30 RX ORDER — HYDROCHLOROTHIAZIDE 25 MG/1
25 TABLET ORAL DAILY
Qty: 90 TABLET | Refills: 3 | Status: SHIPPED | OUTPATIENT
Start: 2021-06-30 | End: 2021-06-30 | Stop reason: SDUPTHER

## 2021-06-30 RX ORDER — HYDROCHLOROTHIAZIDE 25 MG/1
25 TABLET ORAL DAILY
Qty: 90 TABLET | Refills: 3 | Status: SHIPPED | OUTPATIENT
Start: 2021-06-30 | End: 2021-11-17 | Stop reason: SDUPTHER

## 2021-10-29 PROCEDURE — 99454 REM MNTR PHYSIOL PARAM 16-30: CPT | Mod: PBBFAC | Performed by: INTERNAL MEDICINE

## 2021-11-01 DIAGNOSIS — M05.79 RHEUMATOID ARTHRITIS INVOLVING MULTIPLE SITES WITH POSITIVE RHEUMATOID FACTOR: ICD-10-CM

## 2021-11-01 RX ORDER — METHOTREXATE 2.5 MG/1
TABLET ORAL
Qty: 52 TABLET | Refills: 3 | OUTPATIENT
Start: 2021-11-01

## 2021-11-01 RX ORDER — FOLIC ACID 1 MG/1
TABLET ORAL
Qty: 180 TABLET | Refills: 3 | OUTPATIENT
Start: 2021-11-01

## 2021-11-03 ENCOUNTER — LAB VISIT (OUTPATIENT)
Dept: LAB | Facility: OTHER | Age: 76
End: 2021-11-03
Attending: INTERNAL MEDICINE
Payer: MEDICARE

## 2021-11-03 DIAGNOSIS — M05.79 RHEUMATOID ARTHRITIS INVOLVING MULTIPLE SITES WITH POSITIVE RHEUMATOID FACTOR: ICD-10-CM

## 2021-11-03 DIAGNOSIS — Z79.631 METHOTREXATE, LONG TERM, CURRENT USE: ICD-10-CM

## 2021-11-03 LAB
ALBUMIN SERPL BCP-MCNC: 3.9 G/DL (ref 3.5–5.2)
ALP SERPL-CCNC: 60 U/L (ref 55–135)
ALT SERPL W/O P-5'-P-CCNC: 24 U/L (ref 10–44)
ANION GAP SERPL CALC-SCNC: 10 MMOL/L (ref 8–16)
AST SERPL-CCNC: 30 U/L (ref 10–40)
BASOPHILS # BLD AUTO: 0.02 K/UL (ref 0–0.2)
BASOPHILS NFR BLD: 0.4 % (ref 0–1.9)
BILIRUB SERPL-MCNC: 0.7 MG/DL (ref 0.1–1)
BUN SERPL-MCNC: 21 MG/DL (ref 8–23)
CALCIUM SERPL-MCNC: 9.6 MG/DL (ref 8.7–10.5)
CHLORIDE SERPL-SCNC: 104 MMOL/L (ref 95–110)
CO2 SERPL-SCNC: 25 MMOL/L (ref 23–29)
CREAT SERPL-MCNC: 1.1 MG/DL (ref 0.5–1.4)
CRP SERPL-MCNC: 3.4 MG/L (ref 0–8.2)
DIFFERENTIAL METHOD: ABNORMAL
EOSINOPHIL # BLD AUTO: 0.3 K/UL (ref 0–0.5)
EOSINOPHIL NFR BLD: 5.9 % (ref 0–8)
ERYTHROCYTE [DISTWIDTH] IN BLOOD BY AUTOMATED COUNT: 13.2 % (ref 11.5–14.5)
ERYTHROCYTE [SEDIMENTATION RATE] IN BLOOD: 7 MM/HR (ref 0–10)
EST. GFR  (AFRICAN AMERICAN): >60 ML/MIN/1.73 M^2
EST. GFR  (NON AFRICAN AMERICAN): >60 ML/MIN/1.73 M^2
GLUCOSE SERPL-MCNC: 87 MG/DL (ref 70–110)
HCT VFR BLD AUTO: 40.5 % (ref 40–54)
HGB BLD-MCNC: 13.7 G/DL (ref 14–18)
IMM GRANULOCYTES # BLD AUTO: 0.02 K/UL (ref 0–0.04)
IMM GRANULOCYTES NFR BLD AUTO: 0.4 % (ref 0–0.5)
LYMPHOCYTES # BLD AUTO: 1.3 K/UL (ref 1–4.8)
LYMPHOCYTES NFR BLD: 24.2 % (ref 18–48)
MCH RBC QN AUTO: 32.6 PG (ref 27–31)
MCHC RBC AUTO-ENTMCNC: 33.8 G/DL (ref 32–36)
MCV RBC AUTO: 96 FL (ref 82–98)
MONOCYTES # BLD AUTO: 0.6 K/UL (ref 0.3–1)
MONOCYTES NFR BLD: 11.6 % (ref 4–15)
NEUTROPHILS # BLD AUTO: 3 K/UL (ref 1.8–7.7)
NEUTROPHILS NFR BLD: 57.5 % (ref 38–73)
NRBC BLD-RTO: 0 /100 WBC
PLATELET # BLD AUTO: 146 K/UL (ref 150–450)
PMV BLD AUTO: 9.7 FL (ref 9.2–12.9)
POTASSIUM SERPL-SCNC: 3.9 MMOL/L (ref 3.5–5.1)
PROT SERPL-MCNC: 7 G/DL (ref 6–8.4)
RBC # BLD AUTO: 4.2 M/UL (ref 4.6–6.2)
SODIUM SERPL-SCNC: 139 MMOL/L (ref 136–145)
WBC # BLD AUTO: 5.24 K/UL (ref 3.9–12.7)

## 2021-11-03 PROCEDURE — 86140 C-REACTIVE PROTEIN: CPT | Performed by: INTERNAL MEDICINE

## 2021-11-03 PROCEDURE — 80053 COMPREHEN METABOLIC PANEL: CPT | Performed by: INTERNAL MEDICINE

## 2021-11-03 PROCEDURE — 36415 COLL VENOUS BLD VENIPUNCTURE: CPT | Performed by: INTERNAL MEDICINE

## 2021-11-03 PROCEDURE — 85651 RBC SED RATE NONAUTOMATED: CPT | Performed by: INTERNAL MEDICINE

## 2021-11-03 PROCEDURE — 85025 COMPLETE CBC W/AUTO DIFF WBC: CPT | Performed by: INTERNAL MEDICINE

## 2021-11-03 RX ORDER — METHOTREXATE 2.5 MG/1
7.5 TABLET ORAL
Qty: 39 TABLET | Refills: 0 | Status: SHIPPED | OUTPATIENT
Start: 2021-11-03 | End: 2022-02-01

## 2021-11-16 ENCOUNTER — PATIENT MESSAGE (OUTPATIENT)
Dept: CARDIOLOGY | Facility: CLINIC | Age: 76
End: 2021-11-16
Payer: MEDICARE

## 2021-11-16 ENCOUNTER — PATIENT MESSAGE (OUTPATIENT)
Dept: RESEARCH | Facility: HOSPITAL | Age: 76
End: 2021-11-16
Payer: MEDICARE

## 2021-11-16 ENCOUNTER — PATIENT MESSAGE (OUTPATIENT)
Dept: PHARMACY | Facility: CLINIC | Age: 76
End: 2021-11-16
Payer: MEDICARE

## 2021-11-17 RX ORDER — HYDROCHLOROTHIAZIDE 25 MG/1
25 TABLET ORAL DAILY
Qty: 90 TABLET | Refills: 3 | Status: SHIPPED | OUTPATIENT
Start: 2021-11-17 | End: 2021-11-18 | Stop reason: SDUPTHER

## 2021-11-17 RX ORDER — IRBESARTAN 300 MG/1
300 TABLET ORAL NIGHTLY
Qty: 90 TABLET | Refills: 3 | Status: SHIPPED | OUTPATIENT
Start: 2021-11-17 | End: 2022-10-25 | Stop reason: SDUPTHER

## 2021-11-18 RX ORDER — HYDROCHLOROTHIAZIDE 25 MG/1
25 TABLET ORAL DAILY
Qty: 90 TABLET | Refills: 3 | OUTPATIENT
Start: 2021-11-18 | End: 2022-06-01 | Stop reason: SDUPTHER

## 2021-11-18 RX ORDER — HYDROCHLOROTHIAZIDE 25 MG/1
25 TABLET ORAL DAILY
Qty: 90 TABLET | Refills: 3 | OUTPATIENT
Start: 2021-11-18 | End: 2021-11-18 | Stop reason: SDUPTHER

## 2021-11-19 ENCOUNTER — TELEPHONE (OUTPATIENT)
Dept: RESEARCH | Facility: HOSPITAL | Age: 76
End: 2021-11-19
Payer: MEDICARE

## 2021-11-22 ENCOUNTER — PATIENT MESSAGE (OUTPATIENT)
Dept: ADMINISTRATIVE | Facility: OTHER | Age: 76
End: 2021-11-22
Payer: MEDICARE

## 2021-11-22 ENCOUNTER — CLINICAL SUPPORT (OUTPATIENT)
Dept: OPHTHALMOLOGY | Facility: CLINIC | Age: 76
End: 2021-11-22
Payer: MEDICARE

## 2021-11-22 ENCOUNTER — OFFICE VISIT (OUTPATIENT)
Dept: OPTOMETRY | Facility: CLINIC | Age: 76
End: 2021-11-22
Payer: MEDICARE

## 2021-11-22 DIAGNOSIS — M05.79 RHEUMATOID ARTHRITIS INVOLVING MULTIPLE SITES WITH POSITIVE RHEUMATOID FACTOR: ICD-10-CM

## 2021-11-22 DIAGNOSIS — H52.4 PRESBYOPIA OF BOTH EYES: ICD-10-CM

## 2021-11-22 DIAGNOSIS — Z79.899 LONG-TERM USE OF PLAQUENIL: ICD-10-CM

## 2021-11-22 DIAGNOSIS — H25.13 NUCLEAR SCLEROSIS OF BOTH EYES: Primary | ICD-10-CM

## 2021-11-22 DIAGNOSIS — H52.223 REGULAR ASTIGMATISM OF BOTH EYES: ICD-10-CM

## 2021-11-22 PROCEDURE — 92015 PR REFRACTION: ICD-10-PCS | Mod: ,,, | Performed by: OPTOMETRIST

## 2021-11-22 PROCEDURE — 99999 PR PBB SHADOW E&M-EST. PATIENT-LVL III: CPT | Mod: PBBFAC,,, | Performed by: OPTOMETRIST

## 2021-11-22 PROCEDURE — 92083 HUMPHREY VISUAL FIELD - OU - BOTH EYES: ICD-10-PCS | Mod: 26,S$PBB,, | Performed by: OPTOMETRIST

## 2021-11-22 PROCEDURE — 99999 PR PBB SHADOW E&M-EST. PATIENT-LVL III: ICD-10-PCS | Mod: PBBFAC,,, | Performed by: OPTOMETRIST

## 2021-11-22 PROCEDURE — 92014 PR EYE EXAM, EST PATIENT,COMPREHESV: ICD-10-PCS | Mod: S$PBB,,, | Performed by: OPTOMETRIST

## 2021-11-22 PROCEDURE — 92134 POSTERIOR SEGMENT OCT RETINA (OCULAR COHERENCE TOMOGRAPHY)-BOTH EYES: ICD-10-PCS | Mod: 26,S$PBB,, | Performed by: OPTOMETRIST

## 2021-11-22 PROCEDURE — 92083 EXTENDED VISUAL FIELD XM: CPT | Mod: PBBFAC | Performed by: OPTOMETRIST

## 2021-11-22 PROCEDURE — 92134 CPTRZ OPH DX IMG PST SGM RTA: CPT | Mod: PBBFAC | Performed by: OPTOMETRIST

## 2021-11-22 PROCEDURE — 92015 DETERMINE REFRACTIVE STATE: CPT | Mod: ,,, | Performed by: OPTOMETRIST

## 2021-11-22 PROCEDURE — 99213 OFFICE O/P EST LOW 20 MIN: CPT | Mod: PBBFAC | Performed by: OPTOMETRIST

## 2021-11-22 PROCEDURE — 92014 COMPRE OPH EXAM EST PT 1/>: CPT | Mod: S$PBB,,, | Performed by: OPTOMETRIST

## 2021-12-01 ENCOUNTER — LAB VISIT (OUTPATIENT)
Dept: LAB | Facility: HOSPITAL | Age: 76
End: 2021-12-01
Attending: INTERNAL MEDICINE
Payer: MEDICARE

## 2021-12-01 ENCOUNTER — RESEARCH ENCOUNTER (OUTPATIENT)
Dept: RESEARCH | Facility: HOSPITAL | Age: 76
End: 2021-12-01

## 2021-12-01 DIAGNOSIS — Z00.6 EXAMINATION OF PARTICIPANT IN CLINICAL TRIAL: ICD-10-CM

## 2021-12-01 DIAGNOSIS — Z00.6 EXAMINATION OF PARTICIPANT IN CLINICAL TRIAL: Primary | ICD-10-CM

## 2021-12-01 LAB — RESEARCH LAB: NORMAL

## 2021-12-01 PROCEDURE — 36415 COLL VENOUS BLD VENIPUNCTURE: CPT | Performed by: INTERNAL MEDICINE

## 2021-12-11 ENCOUNTER — PATIENT MESSAGE (OUTPATIENT)
Dept: ADMINISTRATIVE | Facility: OTHER | Age: 76
End: 2021-12-11
Payer: MEDICARE

## 2022-01-12 ENCOUNTER — PATIENT MESSAGE (OUTPATIENT)
Dept: RHEUMATOLOGY | Facility: CLINIC | Age: 77
End: 2022-01-12
Payer: MEDICARE

## 2022-01-12 DIAGNOSIS — Z79.631 METHOTREXATE, LONG TERM, CURRENT USE: Primary | ICD-10-CM

## 2022-01-12 RX ORDER — FOLIC ACID 1 MG/1
2000 TABLET ORAL DAILY
Qty: 180 TABLET | Refills: 3 | Status: SHIPPED | OUTPATIENT
Start: 2022-01-12 | End: 2022-12-20

## 2022-03-05 ENCOUNTER — PATIENT MESSAGE (OUTPATIENT)
Dept: RHEUMATOLOGY | Facility: CLINIC | Age: 77
End: 2022-03-05
Payer: MEDICARE

## 2022-03-06 ENCOUNTER — PATIENT MESSAGE (OUTPATIENT)
Dept: RHEUMATOLOGY | Facility: CLINIC | Age: 77
End: 2022-03-06
Payer: MEDICARE

## 2022-03-08 ENCOUNTER — PATIENT MESSAGE (OUTPATIENT)
Dept: RHEUMATOLOGY | Facility: CLINIC | Age: 77
End: 2022-03-08
Payer: MEDICARE

## 2022-03-15 ENCOUNTER — LAB VISIT (OUTPATIENT)
Dept: LAB | Facility: OTHER | Age: 77
End: 2022-03-15
Payer: MEDICARE

## 2022-03-15 DIAGNOSIS — Z79.631 METHOTREXATE, LONG TERM, CURRENT USE: ICD-10-CM

## 2022-03-15 DIAGNOSIS — M05.79 RHEUMATOID ARTHRITIS INVOLVING MULTIPLE SITES WITH POSITIVE RHEUMATOID FACTOR: ICD-10-CM

## 2022-03-15 LAB
ALBUMIN SERPL BCP-MCNC: 3.7 G/DL (ref 3.5–5.2)
ALP SERPL-CCNC: 74 U/L (ref 55–135)
ALT SERPL W/O P-5'-P-CCNC: 25 U/L (ref 10–44)
ANION GAP SERPL CALC-SCNC: 8 MMOL/L (ref 8–16)
AST SERPL-CCNC: 32 U/L (ref 10–40)
BASOPHILS # BLD AUTO: 0.03 K/UL (ref 0–0.2)
BASOPHILS NFR BLD: 0.5 % (ref 0–1.9)
BILIRUB SERPL-MCNC: 0.5 MG/DL (ref 0.1–1)
BUN SERPL-MCNC: 24 MG/DL (ref 8–23)
CALCIUM SERPL-MCNC: 9 MG/DL (ref 8.7–10.5)
CHLORIDE SERPL-SCNC: 104 MMOL/L (ref 95–110)
CO2 SERPL-SCNC: 28 MMOL/L (ref 23–29)
CREAT SERPL-MCNC: 1 MG/DL (ref 0.5–1.4)
CRP SERPL-MCNC: 3 MG/L (ref 0–8.2)
DIFFERENTIAL METHOD: ABNORMAL
EOSINOPHIL # BLD AUTO: 0.2 K/UL (ref 0–0.5)
EOSINOPHIL NFR BLD: 3.4 % (ref 0–8)
ERYTHROCYTE [DISTWIDTH] IN BLOOD BY AUTOMATED COUNT: 13.2 % (ref 11.5–14.5)
ERYTHROCYTE [SEDIMENTATION RATE] IN BLOOD: 4 MM/HR (ref 0–10)
EST. GFR  (AFRICAN AMERICAN): >60 ML/MIN/1.73 M^2
EST. GFR  (NON AFRICAN AMERICAN): >60 ML/MIN/1.73 M^2
GLUCOSE SERPL-MCNC: 87 MG/DL (ref 70–110)
HCT VFR BLD AUTO: 40.4 % (ref 40–54)
HGB BLD-MCNC: 13.8 G/DL (ref 14–18)
IMM GRANULOCYTES # BLD AUTO: 0.02 K/UL (ref 0–0.04)
IMM GRANULOCYTES NFR BLD AUTO: 0.4 % (ref 0–0.5)
LYMPHOCYTES # BLD AUTO: 1.1 K/UL (ref 1–4.8)
LYMPHOCYTES NFR BLD: 19.4 % (ref 18–48)
MCH RBC QN AUTO: 32.2 PG (ref 27–31)
MCHC RBC AUTO-ENTMCNC: 34.2 G/DL (ref 32–36)
MCV RBC AUTO: 94 FL (ref 82–98)
MONOCYTES # BLD AUTO: 0.6 K/UL (ref 0.3–1)
MONOCYTES NFR BLD: 10.4 % (ref 4–15)
NEUTROPHILS # BLD AUTO: 3.7 K/UL (ref 1.8–7.7)
NEUTROPHILS NFR BLD: 65.9 % (ref 38–73)
NRBC BLD-RTO: 0 /100 WBC
PLATELET # BLD AUTO: 139 K/UL (ref 150–450)
PMV BLD AUTO: 9.5 FL (ref 9.2–12.9)
POTASSIUM SERPL-SCNC: 4 MMOL/L (ref 3.5–5.1)
PROT SERPL-MCNC: 6.9 G/DL (ref 6–8.4)
RBC # BLD AUTO: 4.28 M/UL (ref 4.6–6.2)
SODIUM SERPL-SCNC: 140 MMOL/L (ref 136–145)
WBC # BLD AUTO: 5.56 K/UL (ref 3.9–12.7)

## 2022-03-15 PROCEDURE — 80053 COMPREHEN METABOLIC PANEL: CPT | Performed by: INTERNAL MEDICINE

## 2022-03-15 PROCEDURE — 85025 COMPLETE CBC W/AUTO DIFF WBC: CPT | Performed by: INTERNAL MEDICINE

## 2022-03-15 PROCEDURE — 85651 RBC SED RATE NONAUTOMATED: CPT | Performed by: INTERNAL MEDICINE

## 2022-03-15 PROCEDURE — 86140 C-REACTIVE PROTEIN: CPT | Performed by: INTERNAL MEDICINE

## 2022-03-15 PROCEDURE — 36415 COLL VENOUS BLD VENIPUNCTURE: CPT | Performed by: INTERNAL MEDICINE

## 2022-03-16 ENCOUNTER — PATIENT MESSAGE (OUTPATIENT)
Dept: OTHER | Facility: OTHER | Age: 77
End: 2022-03-16
Payer: MEDICARE

## 2022-03-20 ENCOUNTER — PATIENT MESSAGE (OUTPATIENT)
Dept: PHARMACY | Facility: CLINIC | Age: 77
End: 2022-03-20
Payer: MEDICARE

## 2022-04-07 ENCOUNTER — TELEPHONE (OUTPATIENT)
Dept: RESEARCH | Facility: HOSPITAL | Age: 77
End: 2022-04-07
Payer: MEDICARE

## 2022-04-07 DIAGNOSIS — Z00.6 EXAMINATION OF PARTICIPANT IN CLINICAL TRIAL: Primary | ICD-10-CM

## 2022-04-07 NOTE — TELEPHONE ENCOUNTER
Study title: PREVENTABLE  IRB #: [id number]  IRB approval date: 5/19/2020  Sponsor: [facility]  Site Number: [id number]  Subject ID: [id number]  Date of Encounter: 4/7/2022    Called patient to schedule their 3-month lab draw for the PREVENTABLE study. Patient did not answer. Message was left to call back.

## 2022-04-12 ENCOUNTER — LAB VISIT (OUTPATIENT)
Dept: LAB | Facility: HOSPITAL | Age: 77
End: 2022-04-12
Attending: INTERNAL MEDICINE
Payer: MEDICARE

## 2022-04-12 ENCOUNTER — RESEARCH ENCOUNTER (OUTPATIENT)
Dept: RESEARCH | Facility: HOSPITAL | Age: 77
End: 2022-04-12
Payer: MEDICARE

## 2022-04-12 DIAGNOSIS — Z00.6 EXAMINATION OF PARTICIPANT IN CLINICAL TRIAL: ICD-10-CM

## 2022-04-12 LAB — RESEARCH LAB: NORMAL

## 2022-04-12 PROCEDURE — 36415 COLL VENOUS BLD VENIPUNCTURE: CPT | Performed by: INTERNAL MEDICINE

## 2022-04-12 NOTE — PROGRESS NOTES
Study title: PREVENTABLE  IRB #: [id number]  IRB approval date: 5/19/2020  Sponsor: [facility]  Site Number: [id number]  Subject ID: [id number]  Date of Encounter: 4/12/2022    Met with patient to complete their 3-Month visit for the PREVENTABLE study. This visit included a blood draw.    Time of lab draw: 12:06  Hours since last meal: 4    Patient denied any AE's and is willing to continue with study. Voiced understanding. Thanked patient for their time.

## 2022-04-20 ENCOUNTER — IMMUNIZATION (OUTPATIENT)
Dept: INTERNAL MEDICINE | Facility: CLINIC | Age: 77
End: 2022-04-20
Payer: MEDICARE

## 2022-04-20 DIAGNOSIS — Z23 NEED FOR VACCINATION: Primary | ICD-10-CM

## 2022-04-20 PROCEDURE — 91305 COVID-19, MRNA, LNP-S, PF, 30 MCG/0.3 ML DOSE VACCINE (PFIZER): CPT | Mod: PBBFAC

## 2022-04-26 ENCOUNTER — OFFICE VISIT (OUTPATIENT)
Dept: RHEUMATOLOGY | Facility: CLINIC | Age: 77
End: 2022-04-26
Payer: MEDICARE

## 2022-04-26 VITALS
TEMPERATURE: 98 F | BODY MASS INDEX: 26.42 KG/M2 | WEIGHT: 188.69 LBS | HEIGHT: 71 IN | DIASTOLIC BLOOD PRESSURE: 86 MMHG | HEART RATE: 72 BPM | SYSTOLIC BLOOD PRESSURE: 124 MMHG

## 2022-04-26 DIAGNOSIS — M05.79 RHEUMATOID ARTHRITIS INVOLVING MULTIPLE SITES WITH POSITIVE RHEUMATOID FACTOR: Primary | Chronic | ICD-10-CM

## 2022-04-26 DIAGNOSIS — Z79.899 LONG-TERM USE OF PLAQUENIL: ICD-10-CM

## 2022-04-26 DIAGNOSIS — Z79.631 METHOTREXATE, LONG TERM, CURRENT USE: ICD-10-CM

## 2022-04-26 DIAGNOSIS — M35.01 SJOGREN'S SYNDROME WITH KERATOCONJUNCTIVITIS SICCA: Chronic | ICD-10-CM

## 2022-04-26 PROCEDURE — 99214 OFFICE O/P EST MOD 30 MIN: CPT | Mod: S$PBB,,, | Performed by: INTERNAL MEDICINE

## 2022-04-26 PROCEDURE — 99999 PR PBB SHADOW E&M-EST. PATIENT-LVL IV: ICD-10-PCS | Mod: PBBFAC,,, | Performed by: INTERNAL MEDICINE

## 2022-04-26 PROCEDURE — 99214 OFFICE O/P EST MOD 30 MIN: CPT | Mod: PBBFAC | Performed by: INTERNAL MEDICINE

## 2022-04-26 PROCEDURE — 99214 PR OFFICE/OUTPT VISIT, EST, LEVL IV, 30-39 MIN: ICD-10-PCS | Mod: S$PBB,,, | Performed by: INTERNAL MEDICINE

## 2022-04-26 PROCEDURE — 99999 PR PBB SHADOW E&M-EST. PATIENT-LVL IV: CPT | Mod: PBBFAC,,, | Performed by: INTERNAL MEDICINE

## 2022-04-26 NOTE — PROGRESS NOTES
Answers for HPI/ROS submitted by the patient on 4/23/2022  fever: No  eye redness: No  mouth sores: No  headaches: No  shortness of breath: No  chest pain: No  trouble swallowing: No  diarrhea: No  constipation: No  unexpected weight change: No  genital sore: No  During the last 3 days, have you had a skin rash?: No  Bruises or bleeds easily: No  cough: No

## 2022-04-26 NOTE — PROGRESS NOTES
"Subjective:       Patient ID: Tom Sutherland is a 76 y.o. male.    Chief Complaint: Disease Management    HPI     RA remains in check with minimal hands stiffness    Still active   Meloxicam about 3/week    Last eye check 11/2021    Lab 3/15/22   Plat 139  ESR 4  CRP 3.0    Review of Systems   Constitutional: Negative for fever and unexpected weight change.   HENT: Negative for mouth sores and trouble swallowing.    Eyes: Negative for redness.   Respiratory: Negative for cough and shortness of breath.    Cardiovascular: Negative for chest pain.   Gastrointestinal: Negative for constipation and diarrhea.   Genitourinary: Negative for genital sores.   Skin: Negative for rash.   Neurological: Negative for headaches.   Hematological: Does not bruise/bleed easily.       Rapid3 Question Responses and Scores 4/23/2022   MDHAQ Score 0.1   Psychologic Score 1.1   Pain Score 0.5   When you awakened in the morning OVER THE LAST WEEK, did you feel stiff? Yes   If Yes, please indicate the number of hours until you are as limber as you will be for the day 2   Fatigue Score 0   Global Health Score 0   RAPID3 Score 0.28        Objective:   /86   Pulse 72   Temp 97.6 °F (36.4 °C)   Ht 5' 11" (1.803 m)   Wt 85.6 kg (188 lb 11.4 oz)   BMI 26.32 kg/m²      Physical Exam   Musculoskeletal:      Comments: Trace swelling R 2nd, 3rd MCP; not tender         12/16/2019 9/11/2020 6/28/2021 4/26/2022   Tender (LOVE-28) 0 / 28  0 / 28  0 / 28  0 / 28    Swollen (LOVE-28) 0 / 28  0 / 28  0 / 28  2 / 28    Provider Global 1 mm 5 mm 5 mm 5 mm   Patient Global 0 mm 10 mm 5 mm 0 mm   ESR 29 mm/hr 6 mm/hr 15 mm/hr 4 mm/hr   CRP 17.7 mg/L 1.4 mg/L 3.7 mg/L 3 mg/L   LOVE-28 (ESR) 2.36 (Remission) 1.39 (Remission) 1.97 (Remission) 1.37 (Remission)   LOVE-28 (CRP) 2.01 (Remission) 1.42 (Remission) 1.59 (Remission) 1.86 (Remission)   CDAI Score 0  0  1  2.5      Lab Results   Component Value Date    SEDRATE 4 03/15/2022        Assessment:       1. " Rheumatoid arthritis involving multiple sites with positive rheumatoid factor    2. Methotrexate, long term, current use    3. Sjogren's syndrome with keratoconjunctivitis sicca    4. Long-term use of Plaquenil            Plan:       Problem List Items Addressed This Visit        Active Problems    Rheumatoid arthritis involving multiple sites with positive rheumatoid factor - Primary (Chronic)     RA remains in remission on current low dose MTX and HCQ    Will cont same and cont to monitor           Relevant Orders    Sedimentation rate    C-Reactive Protein    Sjogren's syndrome (Chronic)     Not particularly symptomatic           Methotrexate, long term, current use     Low platelets stable with no clinical complications; will cont to monitor           Relevant Orders    Comprehensive Metabolic Panel    CBC Auto Differential    Long-term use of Plaquenil     Went to Dr Dominguez Nov 2021

## 2022-05-27 ENCOUNTER — PATIENT MESSAGE (OUTPATIENT)
Dept: ADMINISTRATIVE | Facility: OTHER | Age: 77
End: 2022-05-27
Payer: MEDICARE

## 2022-06-02 RX ORDER — HYDROCHLOROTHIAZIDE 25 MG/1
25 TABLET ORAL DAILY
Qty: 90 TABLET | Refills: 3 | Status: SHIPPED | OUTPATIENT
Start: 2022-06-02 | End: 2023-05-10 | Stop reason: SDUPTHER

## 2022-06-02 RX ORDER — HYDROCHLOROTHIAZIDE 25 MG/1
25 TABLET ORAL DAILY
Qty: 90 TABLET | Refills: 3 | OUTPATIENT
Start: 2022-06-02 | End: 2022-06-02 | Stop reason: SDUPTHER

## 2022-07-05 ENCOUNTER — PATIENT MESSAGE (OUTPATIENT)
Dept: RHEUMATOLOGY | Facility: CLINIC | Age: 77
End: 2022-07-05
Payer: MEDICARE

## 2022-07-05 DIAGNOSIS — M35.01 SJOGREN'S SYNDROME WITH KERATOCONJUNCTIVITIS SICCA: ICD-10-CM

## 2022-07-05 RX ORDER — CEVIMELINE HYDROCHLORIDE 30 MG/1
30 CAPSULE ORAL 3 TIMES DAILY
Qty: 180 CAPSULE | Refills: 3 | Status: SHIPPED | OUTPATIENT
Start: 2022-07-05 | End: 2022-07-06 | Stop reason: SDUPTHER

## 2022-07-06 DIAGNOSIS — M35.01 SJOGREN'S SYNDROME WITH KERATOCONJUNCTIVITIS SICCA: ICD-10-CM

## 2022-07-06 RX ORDER — CEVIMELINE HYDROCHLORIDE 30 MG/1
30 CAPSULE ORAL 3 TIMES DAILY
Qty: 180 CAPSULE | Refills: 3 | Status: SHIPPED | OUTPATIENT
Start: 2022-07-06 | End: 2022-10-19 | Stop reason: SDUPTHER

## 2022-07-10 ENCOUNTER — PATIENT MESSAGE (OUTPATIENT)
Dept: PHARMACY | Facility: CLINIC | Age: 77
End: 2022-07-10
Payer: MEDICARE

## 2022-07-11 ENCOUNTER — LAB VISIT (OUTPATIENT)
Dept: LAB | Facility: OTHER | Age: 77
End: 2022-07-11
Attending: INTERNAL MEDICINE
Payer: MEDICARE

## 2022-07-11 DIAGNOSIS — Z79.631 METHOTREXATE, LONG TERM, CURRENT USE: ICD-10-CM

## 2022-07-11 DIAGNOSIS — M05.79 RHEUMATOID ARTHRITIS INVOLVING MULTIPLE SITES WITH POSITIVE RHEUMATOID FACTOR: Chronic | ICD-10-CM

## 2022-07-11 LAB
ALBUMIN SERPL BCP-MCNC: 3.8 G/DL (ref 3.5–5.2)
ALP SERPL-CCNC: 74 U/L (ref 55–135)
ALT SERPL W/O P-5'-P-CCNC: 23 U/L (ref 10–44)
ANION GAP SERPL CALC-SCNC: 12 MMOL/L (ref 8–16)
AST SERPL-CCNC: 29 U/L (ref 10–40)
BASOPHILS # BLD AUTO: 0.03 K/UL (ref 0–0.2)
BASOPHILS NFR BLD: 0.7 % (ref 0–1.9)
BILIRUB SERPL-MCNC: 0.6 MG/DL (ref 0.1–1)
BUN SERPL-MCNC: 23 MG/DL (ref 8–23)
CALCIUM SERPL-MCNC: 9.1 MG/DL (ref 8.7–10.5)
CHLORIDE SERPL-SCNC: 102 MMOL/L (ref 95–110)
CO2 SERPL-SCNC: 26 MMOL/L (ref 23–29)
CREAT SERPL-MCNC: 1.1 MG/DL (ref 0.5–1.4)
CRP SERPL-MCNC: 4.3 MG/L (ref 0–8.2)
DIFFERENTIAL METHOD: ABNORMAL
EOSINOPHIL # BLD AUTO: 0.1 K/UL (ref 0–0.5)
EOSINOPHIL NFR BLD: 1.8 % (ref 0–8)
ERYTHROCYTE [DISTWIDTH] IN BLOOD BY AUTOMATED COUNT: 13.2 % (ref 11.5–14.5)
ERYTHROCYTE [SEDIMENTATION RATE] IN BLOOD: 13 MM/HR (ref 0–10)
EST. GFR  (AFRICAN AMERICAN): >60 ML/MIN/1.73 M^2
EST. GFR  (NON AFRICAN AMERICAN): >60 ML/MIN/1.73 M^2
GLUCOSE SERPL-MCNC: 85 MG/DL (ref 70–110)
HCT VFR BLD AUTO: 38.4 % (ref 40–54)
HGB BLD-MCNC: 13.3 G/DL (ref 14–18)
IMM GRANULOCYTES # BLD AUTO: 0.03 K/UL (ref 0–0.04)
IMM GRANULOCYTES NFR BLD AUTO: 0.7 % (ref 0–0.5)
LYMPHOCYTES # BLD AUTO: 1 K/UL (ref 1–4.8)
LYMPHOCYTES NFR BLD: 22.2 % (ref 18–48)
MCH RBC QN AUTO: 33.1 PG (ref 27–31)
MCHC RBC AUTO-ENTMCNC: 34.6 G/DL (ref 32–36)
MCV RBC AUTO: 96 FL (ref 82–98)
MONOCYTES # BLD AUTO: 0.8 K/UL (ref 0.3–1)
MONOCYTES NFR BLD: 17 % (ref 4–15)
NEUTROPHILS # BLD AUTO: 2.6 K/UL (ref 1.8–7.7)
NEUTROPHILS NFR BLD: 57.6 % (ref 38–73)
NRBC BLD-RTO: 0 /100 WBC
PLATELET # BLD AUTO: 166 K/UL (ref 150–450)
PMV BLD AUTO: 10.1 FL (ref 9.2–12.9)
POTASSIUM SERPL-SCNC: 4.1 MMOL/L (ref 3.5–5.1)
PROT SERPL-MCNC: 6.7 G/DL (ref 6–8.4)
RBC # BLD AUTO: 4.02 M/UL (ref 4.6–6.2)
SODIUM SERPL-SCNC: 140 MMOL/L (ref 136–145)
WBC # BLD AUTO: 4.54 K/UL (ref 3.9–12.7)

## 2022-07-11 PROCEDURE — 85025 COMPLETE CBC W/AUTO DIFF WBC: CPT | Performed by: INTERNAL MEDICINE

## 2022-07-11 PROCEDURE — 36415 COLL VENOUS BLD VENIPUNCTURE: CPT | Performed by: INTERNAL MEDICINE

## 2022-07-11 PROCEDURE — 86140 C-REACTIVE PROTEIN: CPT | Performed by: INTERNAL MEDICINE

## 2022-07-11 PROCEDURE — 80053 COMPREHEN METABOLIC PANEL: CPT | Performed by: INTERNAL MEDICINE

## 2022-07-11 PROCEDURE — 85651 RBC SED RATE NONAUTOMATED: CPT | Performed by: INTERNAL MEDICINE

## 2022-09-13 ENCOUNTER — PATIENT MESSAGE (OUTPATIENT)
Dept: RHEUMATOLOGY | Facility: CLINIC | Age: 77
End: 2022-09-13
Payer: MEDICARE

## 2022-09-22 ENCOUNTER — LAB VISIT (OUTPATIENT)
Dept: LAB | Facility: OTHER | Age: 77
End: 2022-09-22
Attending: INTERNAL MEDICINE
Payer: MEDICARE

## 2022-09-22 DIAGNOSIS — Z79.631 METHOTREXATE, LONG TERM, CURRENT USE: ICD-10-CM

## 2022-09-22 DIAGNOSIS — M05.79 RHEUMATOID ARTHRITIS INVOLVING MULTIPLE SITES WITH POSITIVE RHEUMATOID FACTOR: Chronic | ICD-10-CM

## 2022-09-22 LAB
ALBUMIN SERPL BCP-MCNC: 4 G/DL (ref 3.5–5.2)
ALP SERPL-CCNC: 72 U/L (ref 55–135)
ALT SERPL W/O P-5'-P-CCNC: 19 U/L (ref 10–44)
ANION GAP SERPL CALC-SCNC: 12 MMOL/L (ref 8–16)
AST SERPL-CCNC: 26 U/L (ref 10–40)
BASOPHILS # BLD AUTO: 0.03 K/UL (ref 0–0.2)
BASOPHILS NFR BLD: 0.5 % (ref 0–1.9)
BILIRUB SERPL-MCNC: 0.6 MG/DL (ref 0.1–1)
BUN SERPL-MCNC: 23 MG/DL (ref 8–23)
CALCIUM SERPL-MCNC: 9.2 MG/DL (ref 8.7–10.5)
CHLORIDE SERPL-SCNC: 99 MMOL/L (ref 95–110)
CO2 SERPL-SCNC: 25 MMOL/L (ref 23–29)
CREAT SERPL-MCNC: 1 MG/DL (ref 0.5–1.4)
CRP SERPL-MCNC: 9.1 MG/L (ref 0–8.2)
DIFFERENTIAL METHOD: ABNORMAL
EOSINOPHIL # BLD AUTO: 0.3 K/UL (ref 0–0.5)
EOSINOPHIL NFR BLD: 5.3 % (ref 0–8)
ERYTHROCYTE [DISTWIDTH] IN BLOOD BY AUTOMATED COUNT: 13.5 % (ref 11.5–14.5)
ERYTHROCYTE [SEDIMENTATION RATE] IN BLOOD: 7 MM/HR (ref 0–10)
EST. GFR  (NO RACE VARIABLE): >60 ML/MIN/1.73 M^2
GLUCOSE SERPL-MCNC: 68 MG/DL (ref 70–110)
HCT VFR BLD AUTO: 43.3 % (ref 40–54)
HGB BLD-MCNC: 14.6 G/DL (ref 14–18)
IMM GRANULOCYTES # BLD AUTO: 0.02 K/UL (ref 0–0.04)
IMM GRANULOCYTES NFR BLD AUTO: 0.4 % (ref 0–0.5)
LYMPHOCYTES # BLD AUTO: 1.1 K/UL (ref 1–4.8)
LYMPHOCYTES NFR BLD: 19.3 % (ref 18–48)
MCH RBC QN AUTO: 31.7 PG (ref 27–31)
MCHC RBC AUTO-ENTMCNC: 33.7 G/DL (ref 32–36)
MCV RBC AUTO: 94 FL (ref 82–98)
MONOCYTES # BLD AUTO: 0.7 K/UL (ref 0.3–1)
MONOCYTES NFR BLD: 13.1 % (ref 4–15)
NEUTROPHILS # BLD AUTO: 3.4 K/UL (ref 1.8–7.7)
NEUTROPHILS NFR BLD: 61.4 % (ref 38–73)
NRBC BLD-RTO: 0 /100 WBC
PLATELET # BLD AUTO: 171 K/UL (ref 150–450)
PMV BLD AUTO: 10.3 FL (ref 9.2–12.9)
POTASSIUM SERPL-SCNC: 3.7 MMOL/L (ref 3.5–5.1)
PROT SERPL-MCNC: 7.4 G/DL (ref 6–8.4)
RBC # BLD AUTO: 4.6 M/UL (ref 4.6–6.2)
SODIUM SERPL-SCNC: 136 MMOL/L (ref 136–145)
WBC # BLD AUTO: 5.48 K/UL (ref 3.9–12.7)

## 2022-09-22 PROCEDURE — 85651 RBC SED RATE NONAUTOMATED: CPT | Performed by: INTERNAL MEDICINE

## 2022-09-22 PROCEDURE — 36415 COLL VENOUS BLD VENIPUNCTURE: CPT | Performed by: INTERNAL MEDICINE

## 2022-09-22 PROCEDURE — 86140 C-REACTIVE PROTEIN: CPT | Performed by: INTERNAL MEDICINE

## 2022-09-22 PROCEDURE — 80053 COMPREHEN METABOLIC PANEL: CPT | Performed by: INTERNAL MEDICINE

## 2022-09-22 PROCEDURE — 85025 COMPLETE CBC W/AUTO DIFF WBC: CPT | Performed by: INTERNAL MEDICINE

## 2022-09-28 ENCOUNTER — OFFICE VISIT (OUTPATIENT)
Dept: RHEUMATOLOGY | Facility: CLINIC | Age: 77
End: 2022-09-28
Payer: MEDICARE

## 2022-09-28 VITALS
BODY MASS INDEX: 26.35 KG/M2 | HEART RATE: 67 BPM | HEIGHT: 71 IN | WEIGHT: 188.25 LBS | DIASTOLIC BLOOD PRESSURE: 88 MMHG | SYSTOLIC BLOOD PRESSURE: 165 MMHG

## 2022-09-28 DIAGNOSIS — M35.01 SJOGREN'S SYNDROME WITH KERATOCONJUNCTIVITIS SICCA: Chronic | ICD-10-CM

## 2022-09-28 DIAGNOSIS — Z79.899 LONG-TERM USE OF PLAQUENIL: ICD-10-CM

## 2022-09-28 DIAGNOSIS — M05.79 RHEUMATOID ARTHRITIS INVOLVING MULTIPLE SITES WITH POSITIVE RHEUMATOID FACTOR: Chronic | ICD-10-CM

## 2022-09-28 DIAGNOSIS — Z79.631 METHOTREXATE, LONG TERM, CURRENT USE: ICD-10-CM

## 2022-09-28 PROCEDURE — 99999 PR PBB SHADOW E&M-EST. PATIENT-LVL III: ICD-10-PCS | Mod: PBBFAC,,, | Performed by: INTERNAL MEDICINE

## 2022-09-28 PROCEDURE — 99999 PR PBB SHADOW E&M-EST. PATIENT-LVL III: CPT | Mod: PBBFAC,,, | Performed by: INTERNAL MEDICINE

## 2022-09-28 PROCEDURE — 99214 OFFICE O/P EST MOD 30 MIN: CPT | Mod: S$PBB,,, | Performed by: INTERNAL MEDICINE

## 2022-09-28 PROCEDURE — 99214 PR OFFICE/OUTPT VISIT, EST, LEVL IV, 30-39 MIN: ICD-10-PCS | Mod: S$PBB,,, | Performed by: INTERNAL MEDICINE

## 2022-09-28 PROCEDURE — 99213 OFFICE O/P EST LOW 20 MIN: CPT | Mod: PBBFAC | Performed by: INTERNAL MEDICINE

## 2022-09-28 RX ORDER — DOXYCYCLINE 100 MG/1
CAPSULE ORAL
COMMUNITY
Start: 2022-06-27 | End: 2023-07-28

## 2022-09-28 ASSESSMENT — ROUTINE ASSESSMENT OF PATIENT INDEX DATA (RAPID3)
PATIENT GLOBAL ASSESSMENT SCORE: 1
MDHAQ FUNCTION SCORE: 0
PAIN SCORE: 0.5
TOTAL RAPID3 SCORE: 0.5
FATIGUE SCORE: 0
PSYCHOLOGICAL DISTRESS SCORE: 0

## 2022-09-28 NOTE — PROGRESS NOTES
Rapid3 Question Responses and Scores 9/26/2022   MDHAQ Score 0   Psychologic Score 0   Pain Score 0.5   When you awakened in the morning OVER THE LAST WEEK, did you feel stiff? Yes   If Yes, please indicate the number of hours until you are as limber as you will be for the day 1   Fatigue Score 0   Global Health Score 1   RAPID3 Score 0.5      Answers submitted by the patient for this visit:  Rheumatology Questionnaire (Submitted on 9/26/2022)  fever: No  eye redness: No  mouth sores: No  headaches: No  shortness of breath: No  chest pain: No  trouble swallowing: No  diarrhea: No  constipation: No  unexpected weight change: No  genital sore: No  During the last 3 days, have you had a skin rash?: No  Bruises or bleeds easily: No  cough: No

## 2022-09-28 NOTE — PROGRESS NOTES
"Subjective:       Patient ID: Tom Sutherland is a 77 y.o. male.    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   HENT:  Negative for mouth sores.    Respiratory:  Negative for cough and shortness of breath.    Cardiovascular:  Negative for chest pain.   Gastrointestinal:  Negative for diarrhea.   Genitourinary:  Negative for dysuria.   Skin:  Negative for rash.   Neurological:  Negative for headaches.   Psychiatric/Behavioral:  Negative for sleep disturbance.        Rapid3 Question Responses and Scores 9/26/2022   MDHAQ Score 0   Psychologic Score 0   Pain Score 0.5   When you awakened in the morning OVER THE LAST WEEK, did you feel stiff? Yes   If Yes, please indicate the number of hours until you are as limber as you will be for the day 1   Fatigue Score 0   Global Health Score 1   RAPID3 Score 0.5      Objective:   BP (!) 165/88   Pulse 67   Ht 5' 11" (1.803 m)   Wt 85.4 kg (188 lb 4.4 oz)   BMI 26.26 kg/m²      Physical Exam     No swollen or tender joints x some thickening of volar flexor R 2nd digit     9/11/2020 6/28/2021 4/26/2022 9/28/2022   Tender (LOVE-28) 0 / 28  0 / 28  0 / 28  0 / 28    Swollen (LOVE-28) 0 / 28  0 / 28 2 / 28 1 / 28    Provider Global 5 mm 5 mm 5 mm 10 mm   Patient Global 10 mm 5 mm 0 mm 10 mm   ESR 6 mm/hr 15 mm/hr 4 mm/hr 7 mm/hr   CRP 1.4 mg/L 3.7 mg/L 3 mg/L 9.1 mg/L   LOVE-28 (ESR) 1.39 (Remission) 1.97 (Remission) 1.37 (Remission) 1.78 (Remission) "   LOVE-28 (CRP) 1.42 (Remission) 1.59 (Remission) 1.86 (Remission) 2.21 (Remission)   CDAI Score 0  1  2.5  3      Lab Results   Component Value Date    SEDRATE 7 09/22/2022          Assessment:       1. Rheumatoid arthritis involving multiple sites with positive rheumatoid factor    2. Methotrexate, long term, current use    3. Sjogren's syndrome with keratoconjunctivitis sicca    4. Long-term use of Plaquenil            Plan:       Problem List Items Addressed This Visit          Active Problems    Rheumatoid arthritis involving multiple sites with positive rheumatoid factor (Chronic)     RA in remission on current combination of conventional DMARD Rx    Will cont current Rx and cont lab q3m for MTX/ssz toxicity    Can return to see me in 6 mo if no issues in interval    Will send note to Dr Porter with photo of skin lesion ; he is scheduled to see her next week         Sjogren's syndrome (Chronic)     Symptomatically better with cevimelene and no extra glandular complications present         Methotrexate, long term, current use     No treatment related adverse effects; will continue to monitor for drug toxicity           Long-term use of Plaquenil     He needs to schedule eye check and will do so

## 2022-09-28 NOTE — ASSESSMENT & PLAN NOTE
RA in remission on current combination of conventional DMARD Rx    Will cont current Rx and cont lab q3m for MTX/ssz toxicity    Can return to see me in 6 mo if no issues in interval    Will send note to Dr Porter with photo of skin lesion ; he is scheduled to see her next week

## 2022-10-05 ENCOUNTER — PATIENT MESSAGE (OUTPATIENT)
Dept: RHEUMATOLOGY | Facility: CLINIC | Age: 77
End: 2022-10-05
Payer: MEDICARE

## 2022-10-07 ENCOUNTER — PATIENT MESSAGE (OUTPATIENT)
Dept: OPTOMETRY | Facility: CLINIC | Age: 77
End: 2022-10-07
Payer: MEDICARE

## 2022-10-11 ENCOUNTER — LAB VISIT (OUTPATIENT)
Dept: LAB | Facility: OTHER | Age: 77
End: 2022-10-11
Attending: INTERNAL MEDICINE
Payer: MEDICARE

## 2022-10-11 DIAGNOSIS — Z79.631 METHOTREXATE, LONG TERM, CURRENT USE: ICD-10-CM

## 2022-10-11 DIAGNOSIS — M05.79 RHEUMATOID ARTHRITIS INVOLVING MULTIPLE SITES WITH POSITIVE RHEUMATOID FACTOR: Chronic | ICD-10-CM

## 2022-10-11 LAB
ALBUMIN SERPL BCP-MCNC: 3.8 G/DL (ref 3.5–5.2)
ALP SERPL-CCNC: 68 U/L (ref 55–135)
ALT SERPL W/O P-5'-P-CCNC: 20 U/L (ref 10–44)
ANION GAP SERPL CALC-SCNC: 4 MMOL/L (ref 8–16)
AST SERPL-CCNC: 29 U/L (ref 10–40)
BASOPHILS # BLD AUTO: 0.02 K/UL (ref 0–0.2)
BASOPHILS NFR BLD: 0.5 % (ref 0–1.9)
BILIRUB SERPL-MCNC: 0.7 MG/DL (ref 0.1–1)
BUN SERPL-MCNC: 18 MG/DL (ref 8–23)
CALCIUM SERPL-MCNC: 8.8 MG/DL (ref 8.7–10.5)
CHLORIDE SERPL-SCNC: 102 MMOL/L (ref 95–110)
CO2 SERPL-SCNC: 29 MMOL/L (ref 23–29)
CREAT SERPL-MCNC: 0.9 MG/DL (ref 0.5–1.4)
CRP SERPL-MCNC: 2.1 MG/L (ref 0–8.2)
DIFFERENTIAL METHOD: ABNORMAL
EOSINOPHIL # BLD AUTO: 0.1 K/UL (ref 0–0.5)
EOSINOPHIL NFR BLD: 2.6 % (ref 0–8)
ERYTHROCYTE [DISTWIDTH] IN BLOOD BY AUTOMATED COUNT: 13.2 % (ref 11.5–14.5)
ERYTHROCYTE [SEDIMENTATION RATE] IN BLOOD: 8 MM/HR (ref 0–10)
EST. GFR  (NO RACE VARIABLE): >60 ML/MIN/1.73 M^2
GLUCOSE SERPL-MCNC: 93 MG/DL (ref 70–110)
HCT VFR BLD AUTO: 39.4 % (ref 40–54)
HGB BLD-MCNC: 13.7 G/DL (ref 14–18)
IMM GRANULOCYTES # BLD AUTO: 0.02 K/UL (ref 0–0.04)
IMM GRANULOCYTES NFR BLD AUTO: 0.5 % (ref 0–0.5)
LYMPHOCYTES # BLD AUTO: 1 K/UL (ref 1–4.8)
LYMPHOCYTES NFR BLD: 22.4 % (ref 18–48)
MCH RBC QN AUTO: 32.2 PG (ref 27–31)
MCHC RBC AUTO-ENTMCNC: 34.8 G/DL (ref 32–36)
MCV RBC AUTO: 93 FL (ref 82–98)
MONOCYTES # BLD AUTO: 0.6 K/UL (ref 0.3–1)
MONOCYTES NFR BLD: 14.6 % (ref 4–15)
NEUTROPHILS # BLD AUTO: 2.5 K/UL (ref 1.8–7.7)
NEUTROPHILS NFR BLD: 59.4 % (ref 38–73)
NRBC BLD-RTO: 0 /100 WBC
PLATELET # BLD AUTO: 135 K/UL (ref 150–450)
PMV BLD AUTO: 10.2 FL (ref 9.2–12.9)
POTASSIUM SERPL-SCNC: 4 MMOL/L (ref 3.5–5.1)
PROT SERPL-MCNC: 6.9 G/DL (ref 6–8.4)
RBC # BLD AUTO: 4.25 M/UL (ref 4.6–6.2)
SODIUM SERPL-SCNC: 135 MMOL/L (ref 136–145)
WBC # BLD AUTO: 4.25 K/UL (ref 3.9–12.7)

## 2022-10-11 PROCEDURE — 85025 COMPLETE CBC W/AUTO DIFF WBC: CPT | Performed by: INTERNAL MEDICINE

## 2022-10-11 PROCEDURE — 86140 C-REACTIVE PROTEIN: CPT | Performed by: INTERNAL MEDICINE

## 2022-10-11 PROCEDURE — 85651 RBC SED RATE NONAUTOMATED: CPT | Performed by: INTERNAL MEDICINE

## 2022-10-11 PROCEDURE — 36415 COLL VENOUS BLD VENIPUNCTURE: CPT | Performed by: INTERNAL MEDICINE

## 2022-10-11 PROCEDURE — 80053 COMPREHEN METABOLIC PANEL: CPT | Performed by: INTERNAL MEDICINE

## 2022-10-19 DIAGNOSIS — M35.01 SJOGREN'S SYNDROME WITH KERATOCONJUNCTIVITIS SICCA: ICD-10-CM

## 2022-10-19 RX ORDER — CEVIMELINE HYDROCHLORIDE 30 MG/1
30 CAPSULE ORAL 3 TIMES DAILY
Qty: 180 CAPSULE | Refills: 3 | Status: SHIPPED | OUTPATIENT
Start: 2022-10-19 | End: 2022-12-08

## 2022-11-23 ENCOUNTER — PATIENT MESSAGE (OUTPATIENT)
Dept: ADMINISTRATIVE | Facility: OTHER | Age: 77
End: 2022-11-23
Payer: MEDICARE

## 2022-12-13 ENCOUNTER — LAB VISIT (OUTPATIENT)
Dept: LAB | Facility: OTHER | Age: 77
End: 2022-12-13
Attending: INTERNAL MEDICINE
Payer: MEDICARE

## 2022-12-13 DIAGNOSIS — Z79.631 METHOTREXATE, LONG TERM, CURRENT USE: ICD-10-CM

## 2022-12-13 DIAGNOSIS — M05.79 RHEUMATOID ARTHRITIS INVOLVING MULTIPLE SITES WITH POSITIVE RHEUMATOID FACTOR: Chronic | ICD-10-CM

## 2022-12-13 LAB
ALBUMIN SERPL BCP-MCNC: 4 G/DL (ref 3.5–5.2)
ALP SERPL-CCNC: 79 U/L (ref 55–135)
ALT SERPL W/O P-5'-P-CCNC: 23 U/L (ref 10–44)
ANION GAP SERPL CALC-SCNC: 11 MMOL/L (ref 8–16)
AST SERPL-CCNC: 30 U/L (ref 10–40)
BASOPHILS # BLD AUTO: 0.02 K/UL (ref 0–0.2)
BASOPHILS NFR BLD: 0.4 % (ref 0–1.9)
BILIRUB SERPL-MCNC: 0.7 MG/DL (ref 0.1–1)
BUN SERPL-MCNC: 20 MG/DL (ref 8–23)
CALCIUM SERPL-MCNC: 9.3 MG/DL (ref 8.7–10.5)
CHLORIDE SERPL-SCNC: 104 MMOL/L (ref 95–110)
CO2 SERPL-SCNC: 25 MMOL/L (ref 23–29)
CREAT SERPL-MCNC: 1.1 MG/DL (ref 0.5–1.4)
CRP SERPL-MCNC: 3.5 MG/L (ref 0–8.2)
DIFFERENTIAL METHOD: ABNORMAL
EOSINOPHIL # BLD AUTO: 0.1 K/UL (ref 0–0.5)
EOSINOPHIL NFR BLD: 2.4 % (ref 0–8)
ERYTHROCYTE [DISTWIDTH] IN BLOOD BY AUTOMATED COUNT: 13 % (ref 11.5–14.5)
ERYTHROCYTE [SEDIMENTATION RATE] IN BLOOD: 9 MM/HR (ref 0–10)
EST. GFR  (NO RACE VARIABLE): >60 ML/MIN/1.73 M^2
GLUCOSE SERPL-MCNC: 103 MG/DL (ref 70–110)
HCT VFR BLD AUTO: 47.5 % (ref 40–54)
HGB BLD-MCNC: 14.3 G/DL (ref 14–18)
IMM GRANULOCYTES # BLD AUTO: 0.02 K/UL (ref 0–0.04)
IMM GRANULOCYTES NFR BLD AUTO: 0.4 % (ref 0–0.5)
LYMPHOCYTES # BLD AUTO: 1.1 K/UL (ref 1–4.8)
LYMPHOCYTES NFR BLD: 20.7 % (ref 18–48)
MCH RBC QN AUTO: 33 PG (ref 27–31)
MCHC RBC AUTO-ENTMCNC: 30.1 G/DL (ref 32–36)
MCV RBC AUTO: 110 FL (ref 82–98)
MONOCYTES # BLD AUTO: 0.8 K/UL (ref 0.3–1)
MONOCYTES NFR BLD: 14.1 % (ref 4–15)
NEUTROPHILS # BLD AUTO: 3.4 K/UL (ref 1.8–7.7)
NEUTROPHILS NFR BLD: 62 % (ref 38–73)
NRBC BLD-RTO: 0 /100 WBC
PLATELET # BLD AUTO: 177 K/UL (ref 150–450)
PMV BLD AUTO: 10.7 FL (ref 9.2–12.9)
POTASSIUM SERPL-SCNC: 4 MMOL/L (ref 3.5–5.1)
PROT SERPL-MCNC: 7.4 G/DL (ref 6–8.4)
RBC # BLD AUTO: 4.33 M/UL (ref 4.6–6.2)
SODIUM SERPL-SCNC: 140 MMOL/L (ref 136–145)
WBC # BLD AUTO: 5.47 K/UL (ref 3.9–12.7)

## 2022-12-13 PROCEDURE — 80053 COMPREHEN METABOLIC PANEL: CPT | Performed by: INTERNAL MEDICINE

## 2022-12-13 PROCEDURE — 85651 RBC SED RATE NONAUTOMATED: CPT | Performed by: INTERNAL MEDICINE

## 2022-12-13 PROCEDURE — 36415 COLL VENOUS BLD VENIPUNCTURE: CPT | Performed by: INTERNAL MEDICINE

## 2022-12-13 PROCEDURE — 86140 C-REACTIVE PROTEIN: CPT | Performed by: INTERNAL MEDICINE

## 2022-12-13 PROCEDURE — 85025 COMPLETE CBC W/AUTO DIFF WBC: CPT | Performed by: INTERNAL MEDICINE

## 2022-12-15 ENCOUNTER — PATIENT MESSAGE (OUTPATIENT)
Dept: RHEUMATOLOGY | Facility: CLINIC | Age: 77
End: 2022-12-15
Payer: MEDICARE

## 2022-12-16 DIAGNOSIS — D52.1 DRUG-INDUCED FOLATE DEFICIENCY ANEMIA: ICD-10-CM

## 2022-12-16 DIAGNOSIS — D75.89 MACROCYTOSIS WITHOUT ANEMIA: Primary | ICD-10-CM

## 2022-12-19 ENCOUNTER — DOCUMENTATION ONLY (OUTPATIENT)
Dept: RESEARCH | Facility: HOSPITAL | Age: 77
End: 2022-12-19
Payer: MEDICARE

## 2022-12-19 NOTE — PROGRESS NOTES
Study: PREVENTABLE ([id number])  Sponsor: [employer]  PI: Dr. Raina Kaur  Date: 2022        Tom uStherland is a participant in the PREVENTABLE Trial, which randomizes patients to atorvastatin 40mg or placebo (double blind) to study if statin use in older adults can prevent dementia, disability, and heart disease. This patient is being evaluated prior to re-ordering their trial medication.     If yes to any, study drug cannot be renewed at this time.   Has the participant ? No  Is the participant currently known to be taking an open-label statin? No  If any of the following are prewsent, verify that patient is still eligible for study drug renewal at this time:  Is there currently unexplained persistent transaminase elevations? No  Is there current active liver disease? No  Has the participant experienced markedly elevated creatine phosphokinase (CPK) levels or myopathy while taking study drug? No  Has the participant experienced any other adverse effects while taking study drug that might prevent the participant from being able to take Atorvastatin 40mg once daily in a reasonably safe manner? No  Is there an active order in the medical record for an open-label statin medication? No  Is the participant now chronically using any of the following medications: clarithromycin, colchicine, cyclosporine, darunavir, elbasvir, fenofibrate, fosamprenavir, gemfibrozil, glecaprevir, grazoprevir, itraconazole, lopinavir, nelfinavir, niacin (dose > 1g/day), pibrentasvir, ritonavir, saquinavir, simeprevir, tipranavir? No      I, Raina Kaur, confirm that the patient Tom Sutherland meets criteria for drug renewal. The electronic data capture system for the trial will be updated accordingly. Please contact [phone] (PREVENTABLE [facility] Phone Number) with any questions or concerns.

## 2023-01-11 ENCOUNTER — PATIENT MESSAGE (OUTPATIENT)
Dept: OPTOMETRY | Facility: CLINIC | Age: 78
End: 2023-01-11
Payer: MEDICARE

## 2023-01-29 ENCOUNTER — PATIENT MESSAGE (OUTPATIENT)
Dept: OTHER | Facility: OTHER | Age: 78
End: 2023-01-29
Payer: MEDICARE

## 2023-01-30 ENCOUNTER — PATIENT MESSAGE (OUTPATIENT)
Dept: RHEUMATOLOGY | Facility: CLINIC | Age: 78
End: 2023-01-30
Payer: MEDICARE

## 2023-01-30 DIAGNOSIS — M05.79 RHEUMATOID ARTHRITIS INVOLVING MULTIPLE SITES WITH POSITIVE RHEUMATOID FACTOR: ICD-10-CM

## 2023-01-31 ENCOUNTER — PATIENT MESSAGE (OUTPATIENT)
Dept: RHEUMATOLOGY | Facility: CLINIC | Age: 78
End: 2023-01-31
Payer: MEDICARE

## 2023-01-31 RX ORDER — METHOTREXATE 2.5 MG/1
TABLET ORAL
Qty: 39 TABLET | Refills: 0 | Status: SHIPPED | OUTPATIENT
Start: 2023-01-31 | End: 2023-02-03

## 2023-02-03 ENCOUNTER — OFFICE VISIT (OUTPATIENT)
Dept: RHEUMATOLOGY | Facility: CLINIC | Age: 78
End: 2023-02-03
Payer: MEDICARE

## 2023-02-03 VITALS
DIASTOLIC BLOOD PRESSURE: 83 MMHG | SYSTOLIC BLOOD PRESSURE: 157 MMHG | HEART RATE: 84 BPM | WEIGHT: 192.88 LBS | HEIGHT: 71 IN | BODY MASS INDEX: 27 KG/M2

## 2023-02-03 DIAGNOSIS — M35.01 SJOGREN'S SYNDROME WITH KERATOCONJUNCTIVITIS SICCA: Chronic | ICD-10-CM

## 2023-02-03 DIAGNOSIS — Z79.631 METHOTREXATE, LONG TERM, CURRENT USE: ICD-10-CM

## 2023-02-03 DIAGNOSIS — M05.79 RHEUMATOID ARTHRITIS INVOLVING MULTIPLE SITES WITH POSITIVE RHEUMATOID FACTOR: Primary | Chronic | ICD-10-CM

## 2023-02-03 DIAGNOSIS — Z79.899 LONG-TERM USE OF PLAQUENIL: ICD-10-CM

## 2023-02-03 PROCEDURE — 99999 PR PBB SHADOW E&M-EST. PATIENT-LVL IV: ICD-10-PCS | Mod: PBBFAC,,, | Performed by: INTERNAL MEDICINE

## 2023-02-03 PROCEDURE — 99214 PR OFFICE/OUTPT VISIT, EST, LEVL IV, 30-39 MIN: ICD-10-PCS | Mod: S$PBB,,, | Performed by: INTERNAL MEDICINE

## 2023-02-03 PROCEDURE — 99999 PR PBB SHADOW E&M-EST. PATIENT-LVL IV: CPT | Mod: PBBFAC,,, | Performed by: INTERNAL MEDICINE

## 2023-02-03 PROCEDURE — 99214 OFFICE O/P EST MOD 30 MIN: CPT | Mod: S$PBB,,, | Performed by: INTERNAL MEDICINE

## 2023-02-03 PROCEDURE — 99214 OFFICE O/P EST MOD 30 MIN: CPT | Mod: 25,PBBFAC | Performed by: INTERNAL MEDICINE

## 2023-02-03 PROCEDURE — 96372 THER/PROPH/DIAG INJ SC/IM: CPT | Mod: PBBFAC

## 2023-02-03 RX ORDER — TRIAMCINOLONE ACETONIDE 1 MG/G
CREAM TOPICAL
COMMUNITY
Start: 2023-01-19 | End: 2023-04-14

## 2023-02-03 RX ORDER — TRIAMCINOLONE ACETONIDE 40 MG/ML
80 INJECTION, SUSPENSION INTRA-ARTICULAR; INTRAMUSCULAR
Status: COMPLETED | OUTPATIENT
Start: 2023-02-03 | End: 2023-02-03

## 2023-02-03 RX ORDER — LEFLUNOMIDE 10 MG/1
10 TABLET ORAL DAILY
Qty: 90 TABLET | Refills: 0 | Status: SHIPPED | OUTPATIENT
Start: 2023-02-03 | End: 2023-04-17

## 2023-02-03 RX ORDER — METHOTREXATE 2.5 MG/1
7.5 TABLET ORAL
Qty: 39 TABLET | Refills: 0 | Status: SHIPPED | OUTPATIENT
Start: 2023-02-03 | End: 2023-05-02

## 2023-02-03 RX ADMIN — TRIAMCINOLONE ACETONIDE 80 MG: 40 INJECTION, SUSPENSION INTRA-ARTICULAR; INTRAMUSCULAR at 11:02

## 2023-02-03 NOTE — PROGRESS NOTES
Rapid3 Question Responses and Scores 2/1/2023   MDHAQ Score 0.1   Psychologic Score 0   Pain Score 1   When you awakened in the morning OVER THE LAST WEEK, did you feel stiff? Yes   If Yes, please indicate the number of hours until you are as limber as you will be for the day 24   Fatigue Score 0.5   Global Health Score 0.5   RAPID3 Score 0.61     Answers submitted by the patient for this visit:  Rheumatology Questionnaire (Submitted on 2/1/2023)  fever: No  eye redness: No  mouth sores: No  headaches: No  shortness of breath: No  chest pain: No  trouble swallowing: No  diarrhea: No  constipation: No  unexpected weight change: No  genital sore: No  During the last 3 days, have you had a skin rash?: No  Bruises or bleeds easily: No  cough: No

## 2023-02-03 NOTE — PROGRESS NOTES
"Subjective:       Patient ID: Tom Sutherland is a 77 y.o. male.    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 stiffness in hands and L knee  Ankles and feet ok    Stubbed toe 2 mo ago      Review of Systems   Constitutional:  Negative for fever and unexpected weight change.   HENT:  Negative for mouth sores and trouble swallowing.    Eyes:  Negative for redness.   Respiratory:  Negative for cough and shortness of breath.    Cardiovascular:  Negative for chest pain.   Gastrointestinal:  Negative for constipation and diarrhea.   Genitourinary:  Negative for genital sores.   Skin:  Negative for rash.   Neurological:  Negative for headaches.   Hematological:  Does not bruise/bleed easily.       Rapid3 Question Responses and Scores 2/1/2023   MDHAQ Score 0.1   Psychologic Score 0   Pain Score 1   When you awakened in the morning OVER THE LAST WEEK, did you feel stiff? Yes   If Yes, please indicate the number of hours until you are as limber as you will be for the day 24   Fatigue Score 0.5   Global Health Score 0.5   RAPID3 Score 0.61      Objective:   BP (!) 157/83   Pulse 84   Ht 5' 11" (1.803 m)   Wt 87.5 kg (192 lb 14.4 oz)   BMI 26.90 kg/m²      Physical Exam      6/28/2021 4/26/2022 9/28/2022 2/3/2023   Tender (LOVE-28) 0 / 28 0 / 28 0 / 28 16 / 28    Swollen (LOVE-28) 0 / 28 2 / 28 1 / 28 12 / 28    Provider Global 5 mm 5 mm 10 mm 40 mm   Patient Global 5 mm 0 mm 10 mm 5 mm   ESR 15 mm/hr 4 mm/hr 7 mm/hr 9 mm/hr   CRP 3.7 mg/L 3 mg/L 9.1 mg/L 3.5 mg/L   LOVE-28 (ESR) 1.97 (Remission) 1.37 (Remission) 1.78 " (Remission) 4.82 (Moderate disease activity)   LOVE-28 (CRP) 1.59 (Remission) 1.86 (Remission) 2.21 (Remission) 4.78 (Moderate disease activity)   CDAI Score 1  2.5  3  32.5      Lab Results   Component Value Date    SEDRATE 9 12/13/2022     Lab Results   Component Value Date    CRP 3.5 12/13/2022      Assessment:       1. Rheumatoid arthritis involving multiple sites with positive rheumatoid factor    2. Sjogren's syndrome with keratoconjunctivitis sicca    3. Methotrexate, long term, current use    4. Long-term use of Plaquenil            Plan:       Problem List Items Addressed This Visit          Active Problems    Rheumatoid arthritis involving multiple sites with positive rheumatoid factor - Primary (Chronic)     RA has worsened with increased disease activity; LOVE is likely higher as I used Dec labs and he flared last month; he has been on methotrexate several years and I am reluctant to increase it for fear of cumulative myelotoxicity as MCV has been elevated; will try adding leflunomide to current methotrexate in place of hydroxychloroquine and sulfasalazine and give IM kenalog as bridge steroid    Repeat labs 1 and 3 mo with new med and RTC me 3 mo         Relevant Medications    leflunomide (ARAVA) 10 MG Tab    methotrexate 2.5 MG Tab    triamcinolone acetonide injection 80 mg (Completed)    Sjogren's syndrome (Chronic)     Stable sx         Methotrexate, long term, current use     Has macrocytosis without anemia c/w long term methotrexate Rx   No hepatotoxicity    Will continue lab monitoring as we add leflunomide 10 mg/d          Long-term use of Plaquenil     Has not had toxicity and will stop it when starts leflunomide

## 2023-02-03 NOTE — ASSESSMENT & PLAN NOTE
RA has worsened with increased disease activity; LOVE is likely higher as I used Dec labs and he flared last month; he has been on methotrexate several years and I am reluctant to increase it for fear of cumulative myelotoxicity as MCV has been elevated; will try adding leflunomide to current methotrexate in place of hydroxychloroquine and sulfasalazine and give IM kenalog as bridge steroid    Repeat labs 1 and 3 mo with new med and RTC me 3 mo

## 2023-02-03 NOTE — ASSESSMENT & PLAN NOTE
Has macrocytosis without anemia c/w long term methotrexate Rx   No hepatotoxicity    Will continue lab monitoring as we add leflunomide 10 mg/d

## 2023-02-06 ENCOUNTER — OFFICE VISIT (OUTPATIENT)
Dept: OPTOMETRY | Facility: CLINIC | Age: 78
End: 2023-02-06
Payer: MEDICARE

## 2023-02-06 DIAGNOSIS — H52.4 PRESBYOPIA OF BOTH EYES: ICD-10-CM

## 2023-02-06 DIAGNOSIS — M05.79 RHEUMATOID ARTHRITIS INVOLVING MULTIPLE SITES WITH POSITIVE RHEUMATOID FACTOR: Primary | ICD-10-CM

## 2023-02-06 DIAGNOSIS — H52.223 REGULAR ASTIGMATISM OF BOTH EYES: ICD-10-CM

## 2023-02-06 DIAGNOSIS — H25.13 NUCLEAR SCLEROSIS OF BOTH EYES: ICD-10-CM

## 2023-02-06 DIAGNOSIS — Z79.899 LONG-TERM USE OF PLAQUENIL: ICD-10-CM

## 2023-02-06 PROCEDURE — 92083 EXTENDED VISUAL FIELD XM: CPT | Mod: PBBFAC | Performed by: OPTOMETRIST

## 2023-02-06 PROCEDURE — 92083 HUMPHREY VISUAL FIELD - OU - BOTH EYES: ICD-10-PCS | Mod: 26,S$PBB,, | Performed by: OPTOMETRIST

## 2023-02-06 PROCEDURE — 92014 PR EYE EXAM, EST PATIENT,COMPREHESV: ICD-10-PCS | Mod: S$PBB,,, | Performed by: OPTOMETRIST

## 2023-02-06 PROCEDURE — 99213 OFFICE O/P EST LOW 20 MIN: CPT | Mod: PBBFAC | Performed by: OPTOMETRIST

## 2023-02-06 PROCEDURE — 99999 PR PBB SHADOW E&M-EST. PATIENT-LVL III: CPT | Mod: PBBFAC,,, | Performed by: OPTOMETRIST

## 2023-02-06 PROCEDURE — 92014 COMPRE OPH EXAM EST PT 1/>: CPT | Mod: S$PBB,,, | Performed by: OPTOMETRIST

## 2023-02-06 PROCEDURE — 92015 PR REFRACTION: ICD-10-PCS | Mod: ,,, | Performed by: OPTOMETRIST

## 2023-02-06 PROCEDURE — 92015 DETERMINE REFRACTIVE STATE: CPT | Mod: ,,, | Performed by: OPTOMETRIST

## 2023-02-06 PROCEDURE — 99999 PR PBB SHADOW E&M-EST. PATIENT-LVL III: ICD-10-PCS | Mod: PBBFAC,,, | Performed by: OPTOMETRIST

## 2023-02-06 NOTE — PROGRESS NOTES
"HPI     eye examination             Comments: History of RA.  Taking Plaquenil (400 mg) per day x 8 years.  Recent flare-up.  Followed by Dr. Lee, who is to discontinue Plaquenil   and prescribe another medication ("something new").  Wears glasses full-time. No apparent change in VA with glasses.           Comments    Patient's age: 77 y.o. 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 yes     If yes, wears  Full-time or part-time?  Full-time  Present glasses are: Bifocal, SV Distance, SV Reading?  Bifocals lens   Approximate age of present glasses:  1 1/2 year old  Got new glasses following last exam, or subsequently?:  yes   Any problem with VA with glasses?   No problems  Wears CLs?:  no  Headaches?  no  Eye pain/discomfort?  no                                                                                     Flashes?  no  Floaters?  No   Diplopia/Double vision?  no  Patient's Ocular History:         Any eye surgeries? no         Any eye injury?  no         Any treatment for eye disease?  No -  takes Plaquenil approx 8   years (400 mg per day since the initiation of treatment), and Methotrexate    three tablets per week) - followed by Dr. Lee in rheumatology.   Family history of eye disease?  no  Significant patient medical history:         1. Diabetes?  no       If yes, IDDM or NIDDM? na   2. HBP?  Yes, controlled with meds               3. Other (describe):  RA   ! OTC eyedrops currently using:  Visine prn, Patanol as needed    ! Prescription eye meds currently using?  No    ! Any history of allergy/adverse reaction to any eye meds used   previously?  no    ! Any history of allergy/adverse reaction to eyedrops used during prior   eye exam(s)? no    ! Any history of allergy/adverse reaction to Novacaine or similar meds?   no   ! Any history of allergy/adverse reaction to " "Epinephrine or similar meds?   no    ! Patient okay with use of anesthetic eyedrops to check eye pressure?    yes        ! Patient okay with use of eyedrops to dilate pupils today?  yes   !  Allergies/Medications/Medical History/Family History reviewed today?    yes      PD =   64/60  Desired reading distance =  19.5"                                                                   Last edited by Dudley Dominguez, OD on 2/6/2023 10:31 AM.            Assessment /Plan     For exam results, see Encounter Report.    1. Rheumatoid arthritis involving multiple sites with positive rheumatoid factor  Wylie Visual Field - OU - Extended - Both Eyes      2. Long-term use of Plaquenil  Wylie Visual Field - OU - Extended - Both Eyes      3. Nuclear sclerosis of both eyes        4. Regular astigmatism of both eyes        5. Presbyopia of both eyes                         Bilateral nuclear sclerosis.   Still no need for cataract surgery in either eye, based on the best-corrected VA in each eye with refraction today.     History of rheumatoid arthritis  History of Plaquenil use approximately 8 years.     No evidence of Plaquenil toxicity in either eye.  No evidence of bulls eye pigmentary change at the posterior pole of either eye    Wylie Visual Field (HVF) test (10-2) done today, without evidence of visual field defect in either eye to suggest Plaquenil toxicity.  Okay to continue Plaquenil treatment under the supervision of rheumatologist, but Irving Sutherland reports that Dr. Lee is to soon discontinue treatment with Plaquenil and will be changing to another medication.     Mixed astigmatism in each eye, with very satisfactory best-corrected VA in each eye.  Presbyopia consistent with age.  New spectacle lens Rx issued for use as desired.         Return in one year - or prior, if any problems or changes in vision noted in the interim, particular after initiating treatment with the new medication for treatment of " rheumatoid arthritis - or if recommended by Dr. Lee.

## 2023-02-06 NOTE — PATIENT INSTRUCTIONS
Bilateral nuclear sclerosis.   Still no need for cataract surgery in either eye, based on the best-corrected VA in each eye with refraction today.     History of rheumatoid arthritis  History of Plaquenil use approximately 8 years.     No evidence of Plaquenil toxicity in either eye.  No evidence of bulls eye pigmentary change at the posterior pole of either eye    Wylie Visual Field (HVF) test (10-2) done today, without evidence of visual field defect in either eye to suggest Plaquenil toxicity.  Okay to continue Plaquenil treatment under the supervision of rheumatologist, but Mr. Sutherland reports that Dr. Lee is to soon discontinue treatment with Plaquenil and will be changing to another medication.     Mixed astigmatism in each eye, with very satisfactory best-corrected VA in each eye.  Presbyopia consistent with age.  New spectacle lens Rx issued for use as desired.         Return in one year - or prior, if any problems or changes in vision noted in the interim, particular after initiating treatment with the new medication for treatment of rheumatoid arthritis - or if recommended by Dr. Lee.

## 2023-02-20 ENCOUNTER — PATIENT MESSAGE (OUTPATIENT)
Dept: OTHER | Facility: OTHER | Age: 78
End: 2023-02-20
Payer: MEDICARE

## 2023-03-06 ENCOUNTER — LAB VISIT (OUTPATIENT)
Dept: LAB | Facility: OTHER | Age: 78
End: 2023-03-06
Attending: INTERNAL MEDICINE
Payer: MEDICARE

## 2023-03-06 DIAGNOSIS — M05.79 RHEUMATOID ARTHRITIS INVOLVING MULTIPLE SITES WITH POSITIVE RHEUMATOID FACTOR: Chronic | ICD-10-CM

## 2023-03-06 DIAGNOSIS — Z79.631 METHOTREXATE, LONG TERM, CURRENT USE: ICD-10-CM

## 2023-03-06 DIAGNOSIS — D75.89 MACROCYTOSIS WITHOUT ANEMIA: ICD-10-CM

## 2023-03-06 DIAGNOSIS — D52.1 DRUG-INDUCED FOLATE DEFICIENCY ANEMIA: ICD-10-CM

## 2023-03-06 LAB
ALBUMIN SERPL BCP-MCNC: 3.6 G/DL (ref 3.5–5.2)
ALP SERPL-CCNC: 73 U/L (ref 55–135)
ALT SERPL W/O P-5'-P-CCNC: 22 U/L (ref 10–44)
ANION GAP SERPL CALC-SCNC: 6 MMOL/L (ref 8–16)
AST SERPL-CCNC: 24 U/L (ref 10–40)
BASOPHILS # BLD AUTO: 0.02 K/UL (ref 0–0.2)
BASOPHILS NFR BLD: 0.4 % (ref 0–1.9)
BILIRUB SERPL-MCNC: 0.4 MG/DL (ref 0.1–1)
BUN SERPL-MCNC: 24 MG/DL (ref 8–23)
CALCIUM SERPL-MCNC: 8.8 MG/DL (ref 8.7–10.5)
CHLORIDE SERPL-SCNC: 104 MMOL/L (ref 95–110)
CO2 SERPL-SCNC: 26 MMOL/L (ref 23–29)
CREAT SERPL-MCNC: 1 MG/DL (ref 0.5–1.4)
CRP SERPL-MCNC: 4.6 MG/L (ref 0–8.2)
DIFFERENTIAL METHOD: ABNORMAL
EOSINOPHIL # BLD AUTO: 0.1 K/UL (ref 0–0.5)
EOSINOPHIL NFR BLD: 1.8 % (ref 0–8)
ERYTHROCYTE [DISTWIDTH] IN BLOOD BY AUTOMATED COUNT: 12.5 % (ref 11.5–14.5)
ERYTHROCYTE [SEDIMENTATION RATE] IN BLOOD: 12 MM/HR (ref 0–10)
EST. GFR  (NO RACE VARIABLE): >60 ML/MIN/1.73 M^2
GLUCOSE SERPL-MCNC: 112 MG/DL (ref 70–110)
HCT VFR BLD AUTO: 41.6 % (ref 40–54)
HGB BLD-MCNC: 14.5 G/DL (ref 14–18)
IMM GRANULOCYTES # BLD AUTO: 0.02 K/UL (ref 0–0.04)
IMM GRANULOCYTES NFR BLD AUTO: 0.4 % (ref 0–0.5)
LYMPHOCYTES # BLD AUTO: 0.6 K/UL (ref 1–4.8)
LYMPHOCYTES NFR BLD: 12.8 % (ref 18–48)
MCH RBC QN AUTO: 32.2 PG (ref 27–31)
MCHC RBC AUTO-ENTMCNC: 34.9 G/DL (ref 32–36)
MCV RBC AUTO: 92 FL (ref 82–98)
MONOCYTES # BLD AUTO: 0.5 K/UL (ref 0.3–1)
MONOCYTES NFR BLD: 10.6 % (ref 4–15)
NEUTROPHILS # BLD AUTO: 3.7 K/UL (ref 1.8–7.7)
NEUTROPHILS NFR BLD: 74 % (ref 38–73)
NRBC BLD-RTO: 0 /100 WBC
PLATELET # BLD AUTO: 149 K/UL (ref 150–450)
PMV BLD AUTO: 9.6 FL (ref 9.2–12.9)
POTASSIUM SERPL-SCNC: 3.5 MMOL/L (ref 3.5–5.1)
PROT SERPL-MCNC: 6.8 G/DL (ref 6–8.4)
RBC # BLD AUTO: 4.5 M/UL (ref 4.6–6.2)
SODIUM SERPL-SCNC: 136 MMOL/L (ref 136–145)
VIT B12 SERPL-MCNC: 600 PG/ML (ref 210–950)
WBC # BLD AUTO: 4.99 K/UL (ref 3.9–12.7)

## 2023-03-06 PROCEDURE — 82607 VITAMIN B-12: CPT | Performed by: INTERNAL MEDICINE

## 2023-03-06 PROCEDURE — 86140 C-REACTIVE PROTEIN: CPT | Performed by: INTERNAL MEDICINE

## 2023-03-06 PROCEDURE — 83921 ORGANIC ACID SINGLE QUANT: CPT | Performed by: INTERNAL MEDICINE

## 2023-03-06 PROCEDURE — 36415 COLL VENOUS BLD VENIPUNCTURE: CPT | Performed by: INTERNAL MEDICINE

## 2023-03-06 PROCEDURE — 85651 RBC SED RATE NONAUTOMATED: CPT | Performed by: INTERNAL MEDICINE

## 2023-03-06 PROCEDURE — 85025 COMPLETE CBC W/AUTO DIFF WBC: CPT | Performed by: INTERNAL MEDICINE

## 2023-03-06 PROCEDURE — 80053 COMPREHEN METABOLIC PANEL: CPT | Performed by: INTERNAL MEDICINE

## 2023-03-10 LAB — METHYLMALONATE SERPL-SCNC: 0.47 UMOL/L

## 2023-03-11 ENCOUNTER — PATIENT MESSAGE (OUTPATIENT)
Dept: RHEUMATOLOGY | Facility: CLINIC | Age: 78
End: 2023-03-11
Payer: MEDICARE

## 2023-03-30 ENCOUNTER — TELEPHONE (OUTPATIENT)
Dept: RHEUMATOLOGY | Facility: CLINIC | Age: 78
End: 2023-03-30
Payer: MEDICARE

## 2023-03-31 NOTE — TELEPHONE ENCOUNTER
Called to inform patient of the need to reschedule 4/3/23 appointment with Dr. Lee-due to a change in the provider's schedule--Dr. Lee has jury duty. Patient verbalizes understanding. Patient will be informed when  appointment is rescheduled.

## 2023-04-05 ENCOUNTER — PATIENT MESSAGE (OUTPATIENT)
Dept: RHEUMATOLOGY | Facility: CLINIC | Age: 78
End: 2023-04-05
Payer: MEDICARE

## 2023-04-14 ENCOUNTER — OFFICE VISIT (OUTPATIENT)
Dept: RHEUMATOLOGY | Facility: CLINIC | Age: 78
End: 2023-04-14
Payer: MEDICARE

## 2023-04-14 VITALS
HEIGHT: 71 IN | SYSTOLIC BLOOD PRESSURE: 149 MMHG | WEIGHT: 180.75 LBS | BODY MASS INDEX: 25.31 KG/M2 | DIASTOLIC BLOOD PRESSURE: 85 MMHG | HEART RATE: 76 BPM

## 2023-04-14 DIAGNOSIS — M05.79 RHEUMATOID ARTHRITIS INVOLVING MULTIPLE SITES WITH POSITIVE RHEUMATOID FACTOR: Primary | Chronic | ICD-10-CM

## 2023-04-14 DIAGNOSIS — Z79.631 METHOTREXATE, LONG TERM, CURRENT USE: ICD-10-CM

## 2023-04-14 PROCEDURE — 99999 PR PBB SHADOW E&M-EST. PATIENT-LVL IV: CPT | Mod: PBBFAC,,, | Performed by: INTERNAL MEDICINE

## 2023-04-14 PROCEDURE — 99214 PR OFFICE/OUTPT VISIT, EST, LEVL IV, 30-39 MIN: ICD-10-PCS | Mod: S$PBB,,, | Performed by: INTERNAL MEDICINE

## 2023-04-14 PROCEDURE — 99214 OFFICE O/P EST MOD 30 MIN: CPT | Mod: S$PBB,,, | Performed by: INTERNAL MEDICINE

## 2023-04-14 PROCEDURE — 99999 PR PBB SHADOW E&M-EST. PATIENT-LVL IV: ICD-10-PCS | Mod: PBBFAC,,, | Performed by: INTERNAL MEDICINE

## 2023-04-14 PROCEDURE — 99214 OFFICE O/P EST MOD 30 MIN: CPT | Mod: PBBFAC | Performed by: INTERNAL MEDICINE

## 2023-04-14 RX ORDER — OMEPRAZOLE 40 MG/1
40 CAPSULE, DELAYED RELEASE ORAL
COMMUNITY
Start: 2023-02-09

## 2023-04-14 NOTE — PROGRESS NOTES
Rapid3 Question Responses and Scores 4/11/2023   MDHAQ Score 0.1   Psychologic Score 0   Pain Score 1   When you awakened in the morning OVER THE LAST WEEK, did you feel stiff? Yes   If Yes, please indicate the number of hours until you are as limber as you will be for the day 4   Fatigue Score 0   Global Health Score 0.5   RAPID3 Score 0.61     Answers submitted by the patient for this visit:  Rheumatology Questionnaire (Submitted on 4/11/2023)  fever: No  eye redness: No  mouth sores: No  headaches: No  shortness of breath: No  chest pain: No  trouble swallowing: No  diarrhea: No  constipation: No  unexpected weight change: No  genital sore: No  During the last 3 days, have you had a skin rash?: No  Bruises or bleeds easily: No  cough: No

## 2023-04-14 NOTE — PROGRESS NOTES
"Subjective:       Patient ID: Tom Sutherland is a 77 y.o. male.    Chief Complaint: Disease Management    HPI  2011 symmetric polyarthritis and dry eyes, mouth  MTX 2011 - present  HCQ 2011 - present  Humira 2012 x 6 mo    [place]; +SSA, SSB, -CCP, RF 22  SSZ added to MTX + HCQ Aug 2015  Feb 2022 switched to METHOTREXATE+LFA     Current:  MTX 7.5 mg / week   bid  SSZ 1000 bid  Meloxicam occasional  cevimilene still helps     Has had a few flare ups of L shoulder on reaching and hand MCP swelling  Knees have felt full      Review of Systems   Constitutional:  Negative for fever and unexpected weight change.   HENT:  Negative for mouth sores and trouble swallowing.    Eyes:  Negative for redness.   Respiratory:  Negative for cough and shortness of breath.    Cardiovascular:  Negative for chest pain.   Gastrointestinal:  Negative for constipation and diarrhea.   Genitourinary:  Negative for genital sores.   Skin:  Negative for rash.   Neurological:  Negative for headaches.   Hematological:  Does not bruise/bleed easily.       Rapid3 Question Responses and Scores 4/11/2023   MDHAQ Score 0.1   Psychologic Score 0   Pain Score 1   When you awakened in the morning OVER THE LAST WEEK, did you feel stiff? Yes   If Yes, please indicate the number of hours until you are as limber as you will be for the day 4   Fatigue Score 0   Global Health Score 0.5   RAPID3 Score 0.61      Objective:   BP (!) 149/85   Pulse 76   Ht 5' 11" (1.803 m)   Wt 82 kg (180 lb 12.4 oz)   BMI 25.21 kg/m²      Physical Exam      4/26/2022 9/28/2022 2/3/2023 4/14/2023   Tender (LOVE-28) 0 / 28  0 / 28 16 / 28 1 / 28    Swollen (LOVE-28) 2 / 28 1 / 28 12 / 28 6 / 28    Provider Global 5 mm 10 mm 40 mm 15 mm   Patient Global 0 mm 10 mm 5 mm 5 mm   ESR 4 mm/hr 7 mm/hr 9 mm/hr 12 mm/hr   CRP 3 mg/L 9.1 mg/L 3.5 mg/L 4.6 mg/L   LOVE-28 (ESR) 1.37 (Remission) 1.78 (Remission) 4.82 (Moderate disease activity) 3.06 (Low disease " activity)   LOVE-28 (CRP) 1.86 (Remission) 2.21 (Remission) 4.78 (Moderate disease activity) 2.9 (Low disease activity)   CDAI Score 2.5  3  32.5  9      Lab Results   Component Value Date    SEDRATE 12 (H) 03/06/2023          Assessment:       1. Rheumatoid arthritis involving multiple sites with positive rheumatoid factor    2. 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            Relevant Orders    Sedimentation rate    C-Reactive Protein    Methotrexate and leflunomide, long term, current use     No treatment related adverse effects; will continue to monitor for drug toxicity shae liver while on methotrexate plus leflunomide            Relevant Orders    Comprehensive Metabolic Panel    CBC Auto Differential

## 2023-04-14 NOTE — ASSESSMENT & PLAN NOTE
No treatment related adverse effects; will continue to monitor for drug toxicity shae liver while on methotrexate plus leflunomide

## 2023-04-15 ENCOUNTER — PATIENT MESSAGE (OUTPATIENT)
Dept: RHEUMATOLOGY | Facility: CLINIC | Age: 78
End: 2023-04-15
Payer: MEDICARE

## 2023-04-16 ENCOUNTER — PATIENT MESSAGE (OUTPATIENT)
Dept: RHEUMATOLOGY | Facility: CLINIC | Age: 78
End: 2023-04-16
Payer: MEDICARE

## 2023-04-17 ENCOUNTER — PATIENT MESSAGE (OUTPATIENT)
Dept: RHEUMATOLOGY | Facility: CLINIC | Age: 78
End: 2023-04-17
Payer: MEDICARE

## 2023-04-17 DIAGNOSIS — M05.79 RHEUMATOID ARTHRITIS INVOLVING MULTIPLE SITES WITH POSITIVE RHEUMATOID FACTOR: Chronic | ICD-10-CM

## 2023-04-17 RX ORDER — LEFLUNOMIDE 20 MG/1
20 TABLET ORAL DAILY
Qty: 90 TABLET | Refills: 0 | Status: SHIPPED | OUTPATIENT
Start: 2023-04-17 | End: 2023-06-29

## 2023-05-01 DIAGNOSIS — M05.79 RHEUMATOID ARTHRITIS INVOLVING MULTIPLE SITES WITH POSITIVE RHEUMATOID FACTOR: Chronic | ICD-10-CM

## 2023-05-02 ENCOUNTER — PATIENT MESSAGE (OUTPATIENT)
Dept: RHEUMATOLOGY | Facility: CLINIC | Age: 78
End: 2023-05-02
Payer: MEDICARE

## 2023-05-02 RX ORDER — METHOTREXATE 2.5 MG/1
TABLET ORAL
Qty: 39 TABLET | Refills: 0 | Status: SHIPPED | OUTPATIENT
Start: 2023-05-02 | End: 2023-08-01

## 2023-05-05 ENCOUNTER — LAB VISIT (OUTPATIENT)
Dept: LAB | Facility: OTHER | Age: 78
End: 2023-05-05
Attending: INTERNAL MEDICINE
Payer: MEDICARE

## 2023-05-05 ENCOUNTER — PATIENT MESSAGE (OUTPATIENT)
Dept: RHEUMATOLOGY | Facility: CLINIC | Age: 78
End: 2023-05-05
Payer: MEDICARE

## 2023-05-05 DIAGNOSIS — M05.79 RHEUMATOID ARTHRITIS INVOLVING MULTIPLE SITES WITH POSITIVE RHEUMATOID FACTOR: Chronic | ICD-10-CM

## 2023-05-05 DIAGNOSIS — Z79.631 METHOTREXATE, LONG TERM, CURRENT USE: ICD-10-CM

## 2023-05-05 LAB
ALBUMIN SERPL BCP-MCNC: 2.8 G/DL (ref 3.5–5.2)
ALP SERPL-CCNC: 83 U/L (ref 55–135)
ALT SERPL W/O P-5'-P-CCNC: 25 U/L (ref 10–44)
ANION GAP SERPL CALC-SCNC: 10 MMOL/L (ref 8–16)
AST SERPL-CCNC: 29 U/L (ref 10–40)
BASOPHILS # BLD AUTO: 0.04 K/UL (ref 0–0.2)
BASOPHILS NFR BLD: 0.6 % (ref 0–1.9)
BILIRUB SERPL-MCNC: 0.6 MG/DL (ref 0.1–1)
BUN SERPL-MCNC: 17 MG/DL (ref 8–23)
CALCIUM SERPL-MCNC: 8.8 MG/DL (ref 8.7–10.5)
CHLORIDE SERPL-SCNC: 101 MMOL/L (ref 95–110)
CO2 SERPL-SCNC: 25 MMOL/L (ref 23–29)
CREAT SERPL-MCNC: 0.9 MG/DL (ref 0.5–1.4)
CRP SERPL-MCNC: 64.3 MG/L (ref 0–8.2)
DIFFERENTIAL METHOD: ABNORMAL
EOSINOPHIL # BLD AUTO: 0.5 K/UL (ref 0–0.5)
EOSINOPHIL NFR BLD: 7.7 % (ref 0–8)
ERYTHROCYTE [DISTWIDTH] IN BLOOD BY AUTOMATED COUNT: 12.9 % (ref 11.5–14.5)
ERYTHROCYTE [SEDIMENTATION RATE] IN BLOOD: 47 MM/HR (ref 0–10)
EST. GFR  (NO RACE VARIABLE): >60 ML/MIN/1.73 M^2
GLUCOSE SERPL-MCNC: 114 MG/DL (ref 70–110)
HCT VFR BLD AUTO: 39.4 % (ref 40–54)
HGB BLD-MCNC: 13.5 G/DL (ref 14–18)
IMM GRANULOCYTES # BLD AUTO: 0.04 K/UL (ref 0–0.04)
IMM GRANULOCYTES NFR BLD AUTO: 0.6 % (ref 0–0.5)
LYMPHOCYTES # BLD AUTO: 0.7 K/UL (ref 1–4.8)
LYMPHOCYTES NFR BLD: 11.2 % (ref 18–48)
MCH RBC QN AUTO: 30.3 PG (ref 27–31)
MCHC RBC AUTO-ENTMCNC: 34.3 G/DL (ref 32–36)
MCV RBC AUTO: 88 FL (ref 82–98)
MONOCYTES # BLD AUTO: 0.8 K/UL (ref 0.3–1)
MONOCYTES NFR BLD: 12.1 % (ref 4–15)
NEUTROPHILS # BLD AUTO: 4.5 K/UL (ref 1.8–7.7)
NEUTROPHILS NFR BLD: 67.8 % (ref 38–73)
NRBC BLD-RTO: 0 /100 WBC
PLATELET # BLD AUTO: 181 K/UL (ref 150–450)
PMV BLD AUTO: 9 FL (ref 9.2–12.9)
POTASSIUM SERPL-SCNC: 3.5 MMOL/L (ref 3.5–5.1)
PROT SERPL-MCNC: 6.6 G/DL (ref 6–8.4)
RBC # BLD AUTO: 4.46 M/UL (ref 4.6–6.2)
SODIUM SERPL-SCNC: 136 MMOL/L (ref 136–145)
WBC # BLD AUTO: 6.63 K/UL (ref 3.9–12.7)

## 2023-05-05 PROCEDURE — 80053 COMPREHEN METABOLIC PANEL: CPT | Performed by: INTERNAL MEDICINE

## 2023-05-05 PROCEDURE — 36415 COLL VENOUS BLD VENIPUNCTURE: CPT | Performed by: INTERNAL MEDICINE

## 2023-05-05 PROCEDURE — 86140 C-REACTIVE PROTEIN: CPT | Performed by: INTERNAL MEDICINE

## 2023-05-05 PROCEDURE — 85651 RBC SED RATE NONAUTOMATED: CPT | Performed by: INTERNAL MEDICINE

## 2023-05-05 PROCEDURE — 85025 COMPLETE CBC W/AUTO DIFF WBC: CPT | Performed by: INTERNAL MEDICINE

## 2023-05-10 ENCOUNTER — PATIENT MESSAGE (OUTPATIENT)
Dept: CARDIOLOGY | Facility: CLINIC | Age: 78
End: 2023-05-10
Payer: MEDICARE

## 2023-05-10 RX ORDER — HYDROCHLOROTHIAZIDE 25 MG/1
25 TABLET ORAL DAILY
Qty: 90 TABLET | Refills: 0 | Status: SHIPPED | OUTPATIENT
Start: 2023-05-10 | End: 2023-05-10 | Stop reason: SDUPTHER

## 2023-05-10 RX ORDER — HYDROCHLOROTHIAZIDE 25 MG/1
25 TABLET ORAL DAILY
Qty: 90 TABLET | Refills: 3 | Status: SHIPPED | OUTPATIENT
Start: 2023-05-10 | End: 2024-05-09

## 2023-05-13 ENCOUNTER — PATIENT MESSAGE (OUTPATIENT)
Dept: OTHER | Facility: OTHER | Age: 78
End: 2023-05-13
Payer: MEDICARE

## 2023-05-28 ENCOUNTER — PATIENT MESSAGE (OUTPATIENT)
Dept: PHARMACY | Facility: CLINIC | Age: 78
End: 2023-05-28
Payer: MEDICARE

## 2023-05-28 ENCOUNTER — PATIENT MESSAGE (OUTPATIENT)
Dept: OTHER | Facility: OTHER | Age: 78
End: 2023-05-28
Payer: MEDICARE

## 2023-06-15 ENCOUNTER — OFFICE VISIT (OUTPATIENT)
Dept: DERMATOLOGY | Facility: CLINIC | Age: 78
End: 2023-06-15
Payer: MEDICARE

## 2023-06-15 DIAGNOSIS — L30.0 NUMMULAR DERMATITIS: Primary | ICD-10-CM

## 2023-06-15 PROCEDURE — 99204 OFFICE O/P NEW MOD 45 MIN: CPT | Mod: S$PBB,,, | Performed by: STUDENT IN AN ORGANIZED HEALTH CARE EDUCATION/TRAINING PROGRAM

## 2023-06-15 PROCEDURE — 99204 PR OFFICE/OUTPT VISIT, NEW, LEVL IV, 45-59 MIN: ICD-10-PCS | Mod: S$PBB,,, | Performed by: STUDENT IN AN ORGANIZED HEALTH CARE EDUCATION/TRAINING PROGRAM

## 2023-06-15 PROCEDURE — 99213 OFFICE O/P EST LOW 20 MIN: CPT | Mod: PBBFAC | Performed by: STUDENT IN AN ORGANIZED HEALTH CARE EDUCATION/TRAINING PROGRAM

## 2023-06-15 PROCEDURE — 99999 PR PBB SHADOW E&M-EST. PATIENT-LVL III: CPT | Mod: PBBFAC,,, | Performed by: STUDENT IN AN ORGANIZED HEALTH CARE EDUCATION/TRAINING PROGRAM

## 2023-06-15 PROCEDURE — 99999 PR PBB SHADOW E&M-EST. PATIENT-LVL III: ICD-10-PCS | Mod: PBBFAC,,, | Performed by: STUDENT IN AN ORGANIZED HEALTH CARE EDUCATION/TRAINING PROGRAM

## 2023-06-15 RX ORDER — TRIAMCINOLONE ACETONIDE 1 MG/G
CREAM TOPICAL
COMMUNITY
Start: 2023-01-20 | End: 2023-10-07

## 2023-06-15 RX ORDER — FLUOCINONIDE 0.5 MG/G
OINTMENT TOPICAL 2 TIMES DAILY
Qty: 60 G | Refills: 2 | Status: SHIPPED | OUTPATIENT
Start: 2023-06-15

## 2023-06-15 NOTE — PROGRESS NOTES
Subjective:      Patient ID:  Tom Sutherland is a 78 y.o. male who presents for   Chief Complaint   Patient presents with    Rash     Right lower leg     Rash - Initial  Affected locations: right lower leg  Duration: 3 months  Signs / symptoms: dryness, itching and redness  Aggravated by: sweating  Relieving factors/Treatments tried: Rx topical steroids  Improvement on treatment: mild    Patient with h/o RA and sjogren's    Had an itchy rash on the lateral right leg that improved with ketoconazole. It then moved to the medial side of her right leg that is not improving with ketocoanzole cream    Review of Systems   Skin:  Positive for itching, rash and dry skin.     Objective:   Physical Exam   Constitutional: He appears well-developed and well-nourished. No distress.   Neurological: He is alert and oriented to person, place, and time. He is not disoriented.   Psychiatric: He has a normal mood and affect.   Skin:   Areas Examined (abnormalities noted in diagram):   RLE Inspected          Diagram Legend     Erythematous scaling macule/papule c/w actinic keratosis       Vascular papule c/w angioma      Pigmented verrucoid papule/plaque c/w seborrheic keratosis      Yellow umbilicated papule c/w sebaceous hyperplasia      Irregularly shaped tan macule c/w lentigo     1-2 mm smooth white papules consistent with Milia      Movable subcutaneous cyst with punctum c/w epidermal inclusion cyst      Subcutaneous movable cyst c/w pilar cyst      Firm pink to brown papule c/w dermatofibroma      Pedunculated fleshy papule(s) c/w skin tag(s)      Evenly pigmented macule c/w junctional nevus     Mildly variegated pigmented, slightly irregular-bordered macule c/w mildly atypical nevus      Flesh colored to evenly pigmented papule c/w intradermal nevus       Pink pearly papule/plaque c/w basal cell carcinoma      Erythematous hyperkeratotic cursted plaque c/w SCC      Surgical scar with no sign of skin cancer recurrence      Open  and closed comedones      Inflammatory papules and pustules      Verrucoid papule consistent consistent with wart     Erythematous eczematous patches and plaques     Dystrophic onycholytic nail with subungual debris c/w onychomycosis     Umbilicated papule    Erythematous-base heme-crusted tan verrucoid plaque consistent with inflamed seborrheic keratosis     Erythematous Silvery Scaling Plaque c/w Psoriasis     See annotation      Assessment / Plan:        Nummular dermatitis--legs. Chronic condition, not at goal  - counseled on dry skin care. Eucerin advanced repair cream BID  - Compression socks for venous insufficiency  -     fluocinonide (LIDEX) 0.05 % ointment; Apply topically 2 (two) times daily. Use to affected areas for up to 2 weeks then take a 1 week break or decrease to 3 times weekly. Do not apply to groin or face. Use to itchy rash on legs  Dispense: 60 g; Refill: 2           Follow up if symptoms worsen or fail to improve.

## 2023-06-15 NOTE — PATIENT INSTRUCTIONS
XEROSIS (DRY SKIN)        Definition    Xerosis is the term for dry skin.  We all have a natural oil coating over our skin produced by the skin oil glands.  If this oil is removed, the skin becomes dry which can lead to cracking, which can lead to inflammation.  Xerosis is usually a long-term problem that recurs often, especially in the winter.    Cause    Long hot baths or showers can remove our natural oil and lead to xerosis.  One should never take more than one bath or shower a day and for no longer than ten minutes.  Use of harsh soaps such as Zest, Dial, and Ivory can worsen and cause xerosis.  Cold winter weather worsens xerosis because the amount of moisture contained in cold air is much less than the amount of moisture in warm air.    Treatment    Treatment is intended to restore the natural oil to your skin.  Keep the skin lubricated.    Do not take more than one bath or shower a day.  Use lukewarm water, not hot.  Hot water dries out the skin.    Use a gentle moisturizing soap such as Cetaphil soap, Oil of Olay, Dove, Basis, Ivory moisture care, Restoraderm cleanser.    When toweling dry, dont rub.  Blot the skin so there is still some water left on the skin.  You should apply a moisturizing cream to all of the skin such as Cerave cream, Cetaphil cream, Lipikar Shafer AP+ Intense Repair Moisturizing Cream or Restoraderm or Eucerin Advanced repair cream.   Alpha hydroxyacid lotions, i.e., AmLactin, also work very well for preventing dry skin, but may burn when used on inflamed or reddened skin.    If you like to swim during the winter months, you should not use soap when getting out of the pool.  When you have finished swimming, rinse off the chlorine with cool to warm water.  If this will be the only shower of the day, then you may use Cetaphil or another mild soap to cleanse your skin.  After the shower, apply a moisturizing cream to all of the skin as above.        [address] [place]  [address]/ [phone] FAX/ [email or web address]

## 2023-06-28 ENCOUNTER — PATIENT MESSAGE (OUTPATIENT)
Dept: OTHER | Facility: OTHER | Age: 78
End: 2023-06-28
Payer: MEDICARE

## 2023-06-29 DIAGNOSIS — M05.79 RHEUMATOID ARTHRITIS INVOLVING MULTIPLE SITES WITH POSITIVE RHEUMATOID FACTOR: Chronic | ICD-10-CM

## 2023-06-29 RX ORDER — LEFLUNOMIDE 20 MG/1
TABLET ORAL
Qty: 90 TABLET | Refills: 1 | Status: SHIPPED | OUTPATIENT
Start: 2023-06-29

## 2023-07-03 ENCOUNTER — PATIENT MESSAGE (OUTPATIENT)
Dept: OTHER | Facility: OTHER | Age: 78
End: 2023-07-03
Payer: MEDICARE

## 2023-07-10 ENCOUNTER — PATIENT MESSAGE (OUTPATIENT)
Dept: OTHER | Facility: OTHER | Age: 78
End: 2023-07-10
Payer: MEDICARE

## 2023-07-10 ENCOUNTER — LAB VISIT (OUTPATIENT)
Dept: LAB | Facility: OTHER | Age: 78
End: 2023-07-10
Attending: INTERNAL MEDICINE
Payer: MEDICARE

## 2023-07-10 DIAGNOSIS — M05.79 RHEUMATOID ARTHRITIS INVOLVING MULTIPLE SITES WITH POSITIVE RHEUMATOID FACTOR: Chronic | ICD-10-CM

## 2023-07-10 DIAGNOSIS — Z79.631 METHOTREXATE, LONG TERM, CURRENT USE: ICD-10-CM

## 2023-07-10 LAB
ALBUMIN SERPL BCP-MCNC: 3.7 G/DL (ref 3.5–5.2)
ALP SERPL-CCNC: 92 U/L (ref 55–135)
ALT SERPL W/O P-5'-P-CCNC: 28 U/L (ref 10–44)
ANION GAP SERPL CALC-SCNC: 10 MMOL/L (ref 8–16)
AST SERPL-CCNC: 32 U/L (ref 10–40)
BASOPHILS # BLD AUTO: 0.02 K/UL (ref 0–0.2)
BASOPHILS NFR BLD: 0.5 % (ref 0–1.9)
BILIRUB SERPL-MCNC: 0.6 MG/DL (ref 0.1–1)
BUN SERPL-MCNC: 15 MG/DL (ref 8–23)
CALCIUM SERPL-MCNC: 9.3 MG/DL (ref 8.7–10.5)
CHLORIDE SERPL-SCNC: 103 MMOL/L (ref 95–110)
CO2 SERPL-SCNC: 26 MMOL/L (ref 23–29)
CREAT SERPL-MCNC: 0.9 MG/DL (ref 0.5–1.4)
CRP SERPL-MCNC: 3.4 MG/L (ref 0–8.2)
DIFFERENTIAL METHOD: ABNORMAL
EOSINOPHIL # BLD AUTO: 0.1 K/UL (ref 0–0.5)
EOSINOPHIL NFR BLD: 3.2 % (ref 0–8)
ERYTHROCYTE [DISTWIDTH] IN BLOOD BY AUTOMATED COUNT: 15.9 % (ref 11.5–14.5)
ERYTHROCYTE [SEDIMENTATION RATE] IN BLOOD: 14 MM/HR (ref 0–10)
EST. GFR  (NO RACE VARIABLE): >60 ML/MIN/1.73 M^2
GLUCOSE SERPL-MCNC: 79 MG/DL (ref 70–110)
HCT VFR BLD AUTO: 42.2 % (ref 40–54)
HGB BLD-MCNC: 14.2 G/DL (ref 14–18)
IMM GRANULOCYTES # BLD AUTO: 0.02 K/UL (ref 0–0.04)
IMM GRANULOCYTES NFR BLD AUTO: 0.5 % (ref 0–0.5)
LYMPHOCYTES # BLD AUTO: 0.7 K/UL (ref 1–4.8)
LYMPHOCYTES NFR BLD: 19 % (ref 18–48)
MCH RBC QN AUTO: 30 PG (ref 27–31)
MCHC RBC AUTO-ENTMCNC: 33.6 G/DL (ref 32–36)
MCV RBC AUTO: 89 FL (ref 82–98)
MONOCYTES # BLD AUTO: 0.5 K/UL (ref 0.3–1)
MONOCYTES NFR BLD: 13.8 % (ref 4–15)
NEUTROPHILS # BLD AUTO: 2.4 K/UL (ref 1.8–7.7)
NEUTROPHILS NFR BLD: 63 % (ref 38–73)
NRBC BLD-RTO: 0 /100 WBC
PLATELET # BLD AUTO: 133 K/UL (ref 150–450)
PMV BLD AUTO: 10 FL (ref 9.2–12.9)
POTASSIUM SERPL-SCNC: 3.9 MMOL/L (ref 3.5–5.1)
PROT SERPL-MCNC: 7.2 G/DL (ref 6–8.4)
RBC # BLD AUTO: 4.73 M/UL (ref 4.6–6.2)
SODIUM SERPL-SCNC: 139 MMOL/L (ref 136–145)
WBC # BLD AUTO: 3.78 K/UL (ref 3.9–12.7)

## 2023-07-10 PROCEDURE — 86140 C-REACTIVE PROTEIN: CPT | Performed by: INTERNAL MEDICINE

## 2023-07-10 PROCEDURE — 85651 RBC SED RATE NONAUTOMATED: CPT | Performed by: INTERNAL MEDICINE

## 2023-07-10 PROCEDURE — 36415 COLL VENOUS BLD VENIPUNCTURE: CPT | Performed by: INTERNAL MEDICINE

## 2023-07-10 PROCEDURE — 85025 COMPLETE CBC W/AUTO DIFF WBC: CPT | Performed by: INTERNAL MEDICINE

## 2023-07-10 PROCEDURE — 80053 COMPREHEN METABOLIC PANEL: CPT | Performed by: INTERNAL MEDICINE

## 2023-07-28 ENCOUNTER — OFFICE VISIT (OUTPATIENT)
Dept: RHEUMATOLOGY | Facility: CLINIC | Age: 78
End: 2023-07-28
Payer: MEDICARE

## 2023-07-28 VITALS
DIASTOLIC BLOOD PRESSURE: 85 MMHG | BODY MASS INDEX: 25 KG/M2 | SYSTOLIC BLOOD PRESSURE: 140 MMHG | WEIGHT: 178.56 LBS | HEIGHT: 71 IN | HEART RATE: 68 BPM

## 2023-07-28 DIAGNOSIS — Z79.631 METHOTREXATE, LONG TERM, CURRENT USE: ICD-10-CM

## 2023-07-28 DIAGNOSIS — D84.821 DRUG-INDUCED IMMUNODEFICIENCY: ICD-10-CM

## 2023-07-28 DIAGNOSIS — M35.01 SJOGREN'S SYNDROME WITH KERATOCONJUNCTIVITIS SICCA: Chronic | ICD-10-CM

## 2023-07-28 DIAGNOSIS — M05.79 RHEUMATOID ARTHRITIS INVOLVING MULTIPLE SITES WITH POSITIVE RHEUMATOID FACTOR: Primary | Chronic | ICD-10-CM

## 2023-07-28 DIAGNOSIS — Z79.899 DRUG-INDUCED IMMUNODEFICIENCY: ICD-10-CM

## 2023-07-28 PROCEDURE — 99214 PR OFFICE/OUTPT VISIT, EST, LEVL IV, 30-39 MIN: ICD-10-PCS | Mod: S$PBB,,, | Performed by: INTERNAL MEDICINE

## 2023-07-28 PROCEDURE — 99999 PR PBB SHADOW E&M-EST. PATIENT-LVL III: ICD-10-PCS | Mod: PBBFAC,,, | Performed by: INTERNAL MEDICINE

## 2023-07-28 PROCEDURE — 99999 PR PBB SHADOW E&M-EST. PATIENT-LVL III: CPT | Mod: PBBFAC,,, | Performed by: INTERNAL MEDICINE

## 2023-07-28 PROCEDURE — 99214 OFFICE O/P EST MOD 30 MIN: CPT | Mod: S$PBB,,, | Performed by: INTERNAL MEDICINE

## 2023-07-28 PROCEDURE — 99213 OFFICE O/P EST LOW 20 MIN: CPT | Mod: PBBFAC | Performed by: INTERNAL MEDICINE

## 2023-07-28 ASSESSMENT — ROUTINE ASSESSMENT OF PATIENT INDEX DATA (RAPID3)
FATIGUE SCORE: 0
PSYCHOLOGICAL DISTRESS SCORE: 0
AM STIFFNESS SCORE: 0, NO
PAIN SCORE: 1
MDHAQ FUNCTION SCORE: 0.1
TOTAL RAPID3 SCORE: 0.61
PATIENT GLOBAL ASSESSMENT SCORE: 0.5

## 2023-07-28 NOTE — ASSESSMENT & PLAN NOTE
RHEUMATOID ARTHRITIS with remission level disease activity on methotrexate plus leflunomide     Moving to [place]; will continue current meds and has arranged f/u rheum care there    Welcome to return prn

## 2023-07-28 NOTE — PROGRESS NOTES
Rapid3 Question Responses and Scores 7/21/2023   MDHAQ Score 0.1   Psychologic Score 0   Pain Score 1   When you awakened in the morning OVER THE LAST WEEK, did you feel stiff? No   If Yes, please indicate the number of hours until you are as limber as you will be for the day -   Fatigue Score 0   Global Health Score 0.5   RAPID3 Score 0.61     Answers submitted by the patient for this visit:  Rheumatology Questionnaire (Submitted on 7/21/2023)  fever: No  eye redness: No  mouth sores: No  headaches: No  shortness of breath: No  chest pain: No  trouble swallowing: No  diarrhea: No  constipation: No  unexpected weight change: No  genital sore: No  During the last 3 days, have you had a skin rash?: No  Bruises or bleeds easily: No  cough: No

## 2023-07-28 NOTE — PROGRESS NOTES
"Subjective:       Patient ID: Tom Sutherland is a 78 y.o. male.    Chief Complaint: Disease Management    HPI    2011 symmetric polyarthritis and dry eyes, mouth  MTX 2011 - present  HCQ 2011 - present  Humira 2012 x 6 mo    [place]; +SSA, SSB, -CCP, RF 22  SSZ added to MTX + HCQ Aug 2015  Feb 2022 switched to METHOTREXATE+LFA     Current:  MTX 7.5 mg / week  LFA 20 mg/d    Moving to [place] after wife has last chemo here 3rd Aug; has appts set up for her and for him; will drive up Aug 4 and close on house Aug 9 then drive      Review of Systems   Constitutional:  Negative for fever and unexpected weight change.   HENT:  Negative for mouth sores and trouble swallowing.    Eyes:  Negative for redness.   Respiratory:  Negative for cough and shortness of breath.    Cardiovascular:  Negative for chest pain.   Gastrointestinal:  Negative for constipation and diarrhea.   Genitourinary:  Negative for genital sores.   Skin:  Negative for rash.   Neurological:  Negative for headaches.   Hematological:  Does not bruise/bleed easily.       Objective:   BP (!) 140/85   Pulse 68   Ht 5' 11" (1.803 m)   Wt 81 kg (178 lb 9.2 oz)   BMI 24.91 kg/m²      Physical Exam   Constitutional: normal appearance.   HENT:   Nose: Nose normal.   Mouth/Throat: No ulcers  Eyes: No scleral icterus.   Cardiovascular: Normal rate and regular rhythm.   Pulmonary/Chest: He has no wheezes. He has no rales.   Abdominal: There is no abdominal tenderness.   Lymphadenopathy:     He has no cervical adenopathy.   Skin: No rash noted.       9/28/2022 2/3/2023 4/14/2023 7/28/2023   Tender (LOVE-28) 0 / 28 16 / 28 1 / 28 0 / 28    Swollen (LOVE-28) 1 / 28 12 / 28 6 / 28 4 / 28    Provider Global 10 mm 40 mm 15 mm 5 mm   Patient Global 10 mm 5 mm 5 mm 5 mm   ESR 7 mm/hr 9 mm/hr 47 mm/hr 14 mm/hr   CRP 9.1 mg/L 3.5 mg/L 64.3 mg/L 3.4 mg/L   LOVE-28 (ESR) 1.78 (Remission) 4.82 (Moderate disease activity) 4.01 (Moderate disease activity) " 2.48 (Remission)   LOVE-28 (CRP) 2.21 (Remission) 4.78 (Moderate disease activity) 3.78 (Moderate disease activity) 2.12 (Remission)   CDAI Score 3  32.5  9  5      Lab Results   Component Value Date    SEDRATE 14 (H) 07/10/2023          Assessment:       1. Rheumatoid arthritis involving multiple sites with positive rheumatoid factor    2. Sjogren's syndrome with keratoconjunctivitis sicca    3. Methotrexate and leflunomide, long term, current use    4. Drug-induced immunodeficiency            Plan:       Problem List Items Addressed This Visit          Active Problems    Rheumatoid arthritis involving multiple sites with positive rheumatoid factor - Primary (Chronic)     RHEUMATOID ARTHRITIS with remission level disease activity on methotrexate plus leflunomide     Moving to [place]; will continue current meds and has arranged f/u rheum care there    Welcome to return prn         Sjogren's syndrome (Chronic)     Minimal if any clinical Sjögren's /sicca symptoms          Methotrexate and leflunomide, long term, current use     Last lab showed sl leukopenia that might warrant reducing methotrexate but will defer for now   No transaminase elevations on current Rx         Drug-induced immunodeficiency     No interval infections

## 2023-07-28 NOTE — ASSESSMENT & PLAN NOTE
Last lab showed sl leukopenia that might warrant reducing methotrexate but will defer for now   No transaminase elevations on current Rx

## 2023-07-31 DIAGNOSIS — M05.79 RHEUMATOID ARTHRITIS INVOLVING MULTIPLE SITES WITH POSITIVE RHEUMATOID FACTOR: Chronic | ICD-10-CM

## 2023-08-01 RX ORDER — METHOTREXATE 2.5 MG/1
TABLET ORAL
Qty: 39 TABLET | Refills: 0 | Status: SHIPPED | OUTPATIENT
Start: 2023-08-01

## 2023-08-06 ENCOUNTER — PATIENT MESSAGE (OUTPATIENT)
Dept: PHARMACY | Facility: CLINIC | Age: 78
End: 2023-08-06
Payer: MEDICARE

## 2023-08-06 DIAGNOSIS — I10 ESSENTIAL HYPERTENSION: Primary | ICD-10-CM

## 2023-08-07 RX ORDER — IRBESARTAN 300 MG/1
300 TABLET ORAL NIGHTLY
Qty: 90 TABLET | Refills: 0 | Status: SHIPPED | OUTPATIENT
Start: 2023-08-07 | End: 2023-10-25

## 2023-08-07 NOTE — TELEPHONE ENCOUNTER
Patients Digital Medicine responsible provider is no longer active. Will place order using Dr. Brown as back up physician until patient establishes care with new responsible provider.

## 2023-08-16 ENCOUNTER — PATIENT MESSAGE (OUTPATIENT)
Dept: OTHER | Facility: OTHER | Age: 78
End: 2023-08-16
Payer: MEDICARE

## 2023-08-26 ENCOUNTER — PATIENT MESSAGE (OUTPATIENT)
Dept: OTHER | Facility: OTHER | Age: 78
End: 2023-08-26
Payer: MEDICARE

## 2023-09-11 ENCOUNTER — PATIENT MESSAGE (OUTPATIENT)
Dept: OTHER | Facility: OTHER | Age: 78
End: 2023-09-11
Payer: MEDICARE

## 2023-10-25 DIAGNOSIS — I10 ESSENTIAL HYPERTENSION: ICD-10-CM

## 2023-10-25 RX ORDER — IRBESARTAN 300 MG/1
300 TABLET ORAL NIGHTLY
Qty: 90 TABLET | Refills: 3 | Status: SHIPPED | OUTPATIENT
Start: 2023-10-25

## 2023-11-01 DIAGNOSIS — M05.79 RHEUMATOID ARTHRITIS INVOLVING MULTIPLE SITES WITH POSITIVE RHEUMATOID FACTOR: Chronic | ICD-10-CM

## 2023-11-02 RX ORDER — METHOTREXATE 2.5 MG/1
TABLET ORAL
Qty: 39 TABLET | Refills: 0 | OUTPATIENT
Start: 2023-11-02

## 2023-12-06 DIAGNOSIS — Z79.631 METHOTREXATE, LONG TERM, CURRENT USE: ICD-10-CM

## 2023-12-06 RX ORDER — FOLIC ACID 1 MG/1
TABLET ORAL
Qty: 180 TABLET | Refills: 3 | Status: SHIPPED | OUTPATIENT
Start: 2023-12-06

## 2023-12-14 ENCOUNTER — RESEARCH ENCOUNTER (OUTPATIENT)
Dept: CARDIOLOGY | Facility: CLINIC | Age: 78
End: 2023-12-14
Payer: MEDICARE

## 2023-12-14 NOTE — PROGRESS NOTES
Study: PREVENTABLE ([id number])  Sponsor: [employer]  PI: Tan Ghosh MD  Date: 14-Dec-2023      Tom Sutherland is a participant in the PREVENTABLE Trial, which randomizes patients to atorvastatin 40mg or placebo (double blind) to study if statin use in older adults can prevent dementia, disability, and heart disease. This patient is being evaluated prior to re-ordering their trial medication.     If yes to any, study drug cannot be renewed at this time.   Has the participant ? no  Is the participant currently known to be taking an open-label statin? no  If any of the following are present, verify that patient is still eligible for study drug renewal at this time:  Is there currently unexplained persistent transaminase elevations? no  Is there current active liver disease? no  Has the participant experienced markedly elevated creatine phosphokinase (CPK) levels or myopathy while taking study drug? no*  Has the participant experienced any other adverse effects while taking study drug that might prevent the participant from being able to take Atorvastatin 40mg once daily in a reasonably safe manner? no  Is there an active order in the medical record for an open-label statin medication? no  Is the participant now chronically using any of the following medications: clarithromycin, colchicine, cyclosporine, darunavir, elbasvir, fenofibrate, fosamprenavir, gemfibrozil, glecaprevir, grazoprevir, itraconazole, lopinavir, nelfinavir, niacin (dose > 1g/day), pibrentasvir, ritonavir, saquinavir, simeprevir, tipranavir? no      I, Tan Ghosh MD, confirm that the patient Tom Sutherland meets criteria for drug renewal. The electronic data capture system for the trial will be updated accordingly. Please contact [phone] (PREVENTABLE [facility] Phone Number) with any questions or concerns.

## 2024-02-25 DIAGNOSIS — M35.01 SJOGREN'S SYNDROME WITH KERATOCONJUNCTIVITIS SICCA: ICD-10-CM

## 2024-02-26 RX ORDER — CEVIMELINE HYDROCHLORIDE 30 MG/1
30 CAPSULE ORAL 3 TIMES DAILY
Qty: 270 CAPSULE | Refills: 3 | Status: SHIPPED | OUTPATIENT
Start: 2024-02-26

## 2024-04-29 ENCOUNTER — PATIENT MESSAGE (OUTPATIENT)
Dept: OTHER | Facility: OTHER | Age: 79
End: 2024-04-29
Payer: MEDICARE

## 2024-05-16 ENCOUNTER — PATIENT MESSAGE (OUTPATIENT)
Dept: ADMINISTRATIVE | Facility: OTHER | Age: 79
End: 2024-05-16
Payer: MEDICARE

## 2024-11-12 ENCOUNTER — PATIENT MESSAGE (OUTPATIENT)
Dept: ADMINISTRATIVE | Facility: OTHER | Age: 79
End: 2024-11-12
Payer: MEDICARE

## 2024-12-08 ENCOUNTER — RESEARCH ENCOUNTER (OUTPATIENT)
Dept: CARDIOLOGY | Facility: CLINIC | Age: 79
End: 2024-12-08
Payer: MEDICARE

## 2024-12-09 NOTE — PROGRESS NOTES
Study: PREVENTABLE ([id number])  Sponsor: [employer]  PI: Tan Ghosh MD  Date: 08-Dec-2024      Tom Sutherland is a participant in the PREVENTABLE Trial, which randomizes patients to atorvastatin 40mg or placebo (double blind) to study if statin use in older adults can prevent dementia, disability, and heart disease. This patient is being evaluated prior to re-ordering their trial medication.     If yes to any, study drug cannot be renewed at this time.   Has the participant ? no  Is the participant currently known to be taking an open-label statin? no  If any of the following are present, verify that patient is still eligible for study drug renewal at this time:  Is there currently unexplained persistent transaminase elevations? no  Is there current active liver disease? no  Has the participant experienced markedly elevated creatine phosphokinase (CPK) levels or myopathy while taking study drug? no*  Has the participant experienced any other adverse effects while taking study drug that might prevent the participant from being able to take Atorvastatin 40mg once daily in a reasonably safe manner? no  Is there an active order in the medical record for an open-label statin medication? no  Is the participant now chronically using any of the following medications: clarithromycin, colchicine, cyclosporine, darunavir, elbasvir, fenofibrate, fosamprenavir, gemfibrozil, glecaprevir, grazoprevir, itraconazole, lopinavir, nelfinavir, niacin (dose > 1g/day), pibrentasvir, ritonavir, saquinavir, simeprevir, tipranavir? no      I, Tan Ghosh MD, confirm that the patient Tom Sutherland meets criteria for drug renewal. The electronic data capture system for the trial will be updated accordingly. Please contact [phone] (PREVENTABLE [facility] Phone Number) with any questions or concerns.

## 2025-03-27 ENCOUNTER — TELEPHONE (OUTPATIENT)
Dept: RESEARCH | Facility: HOSPITAL | Age: 80
End: 2025-03-27
Payer: MEDICARE

## 2025-03-27 ENCOUNTER — RESEARCH ENCOUNTER (OUTPATIENT)
Dept: RESEARCH | Facility: HOSPITAL | Age: 80
End: 2025-03-27
Payer: MEDICARE

## 2025-03-27 NOTE — PROGRESS NOTES
Preventable ([id number]) CRC spoke to Mr. Sutherland regarding his current medication status as it relates to the research study. He is being placed on a statin medication by his cardiologist in [place] due to a recent stroke event. He had a lipid panel drawn 7/10/2024, against study protocol. He agreed to stay enrolled in the research study but have his status changed to study drug - permanently discontinued, open-label statin. Site's  made aware of the patient's study drug status.

## 2025-03-27 NOTE — TELEPHONE ENCOUNTER
Preventable (2020.192) Research site received a call from Mr. Sutherland stating he had a recent stroke and due to that is being placed on a statin medication by his doctor. I attempted to call and confirm those details so that I can switch his drug status in the research study. The call went to , I left my number for a return call.